# Patient Record
Sex: MALE | Race: WHITE | NOT HISPANIC OR LATINO
[De-identification: names, ages, dates, MRNs, and addresses within clinical notes are randomized per-mention and may not be internally consistent; named-entity substitution may affect disease eponyms.]

---

## 2024-03-19 ENCOUNTER — TRANSCRIPTION ENCOUNTER (OUTPATIENT)
Age: 74
End: 2024-03-19

## 2024-03-19 ENCOUNTER — RESULT REVIEW (OUTPATIENT)
Age: 74
End: 2024-03-19

## 2024-03-19 ENCOUNTER — INPATIENT (INPATIENT)
Facility: HOSPITAL | Age: 74
LOS: 0 days | Discharge: HOME | DRG: 313 | End: 2024-03-19
Attending: INTERNAL MEDICINE | Admitting: INTERNAL MEDICINE
Payer: MEDICAID

## 2024-03-19 VITALS
HEIGHT: 66 IN | HEART RATE: 77 BPM | OXYGEN SATURATION: 99 % | RESPIRATION RATE: 18 BRPM | SYSTOLIC BLOOD PRESSURE: 130 MMHG | DIASTOLIC BLOOD PRESSURE: 67 MMHG | WEIGHT: 119.93 LBS | TEMPERATURE: 98 F

## 2024-03-19 VITALS
SYSTOLIC BLOOD PRESSURE: 122 MMHG | DIASTOLIC BLOOD PRESSURE: 58 MMHG | RESPIRATION RATE: 18 BRPM | HEART RATE: 64 BPM | OXYGEN SATURATION: 95 %

## 2024-03-19 DIAGNOSIS — R07.9 CHEST PAIN, UNSPECIFIED: ICD-10-CM

## 2024-03-19 DIAGNOSIS — I49.9 CARDIAC ARRHYTHMIA, UNSPECIFIED: ICD-10-CM

## 2024-03-19 PROBLEM — Z00.00 ENCOUNTER FOR PREVENTIVE HEALTH EXAMINATION: Status: ACTIVE | Noted: 2024-03-19

## 2024-03-19 LAB
A1C WITH ESTIMATED AVERAGE GLUCOSE RESULT: 5.9 % — HIGH (ref 4–5.6)
A1C WITH ESTIMATED AVERAGE GLUCOSE RESULT: 6.2 % — HIGH (ref 4–5.6)
ADD ON TEST-SPECIMEN IN LAB: SIGNIFICANT CHANGE UP
AMPHET UR-MCNC: NEGATIVE — SIGNIFICANT CHANGE UP
ANION GAP SERPL CALC-SCNC: 13 MMOL/L — SIGNIFICANT CHANGE UP (ref 5–17)
ANION GAP SERPL CALC-SCNC: 8 MMOL/L — SIGNIFICANT CHANGE UP (ref 5–17)
ANISOCYTOSIS BLD QL: SLIGHT — SIGNIFICANT CHANGE UP
APPEARANCE UR: CLEAR — SIGNIFICANT CHANGE UP
APTT BLD: 25.9 SEC — SIGNIFICANT CHANGE UP (ref 24.5–35.6)
BARBITURATES UR SCN-MCNC: NEGATIVE — SIGNIFICANT CHANGE UP
BASOPHILS # BLD AUTO: 0.08 K/UL — SIGNIFICANT CHANGE UP (ref 0–0.2)
BASOPHILS NFR BLD AUTO: 0.8 % — SIGNIFICANT CHANGE UP (ref 0–2)
BENZODIAZ UR-MCNC: NEGATIVE — SIGNIFICANT CHANGE UP
BILIRUB UR-MCNC: NEGATIVE — SIGNIFICANT CHANGE UP
BUN SERPL-MCNC: 17 MG/DL — SIGNIFICANT CHANGE UP (ref 7–23)
BUN SERPL-MCNC: 20 MG/DL — SIGNIFICANT CHANGE UP (ref 7–23)
BURR CELLS BLD QL SMEAR: PRESENT — SIGNIFICANT CHANGE UP
CALCIUM SERPL-MCNC: 8.7 MG/DL — SIGNIFICANT CHANGE UP (ref 8.4–10.5)
CALCIUM SERPL-MCNC: 8.7 MG/DL — SIGNIFICANT CHANGE UP (ref 8.4–10.5)
CHLORIDE SERPL-SCNC: 100 MMOL/L — SIGNIFICANT CHANGE UP (ref 96–108)
CHLORIDE SERPL-SCNC: 104 MMOL/L — SIGNIFICANT CHANGE UP (ref 96–108)
CHOLEST SERPL-MCNC: 164 MG/DL — SIGNIFICANT CHANGE UP
CO2 SERPL-SCNC: 22 MMOL/L — SIGNIFICANT CHANGE UP (ref 22–31)
CO2 SERPL-SCNC: 26 MMOL/L — SIGNIFICANT CHANGE UP (ref 22–31)
COCAINE METAB.OTHER UR-MCNC: NEGATIVE — SIGNIFICANT CHANGE UP
COLOR SPEC: SIGNIFICANT CHANGE UP
CREAT SERPL-MCNC: 0.72 MG/DL — SIGNIFICANT CHANGE UP (ref 0.5–1.3)
CREAT SERPL-MCNC: 0.73 MG/DL — SIGNIFICANT CHANGE UP (ref 0.5–1.3)
DIFF PNL FLD: NEGATIVE — SIGNIFICANT CHANGE UP
EGFR: 95 ML/MIN/1.73M2 — SIGNIFICANT CHANGE UP
EGFR: 95 ML/MIN/1.73M2 — SIGNIFICANT CHANGE UP
ELLIPTOCYTES BLD QL SMEAR: SLIGHT — SIGNIFICANT CHANGE UP
EOSINOPHIL # BLD AUTO: 0 K/UL — SIGNIFICANT CHANGE UP (ref 0–0.5)
EOSINOPHIL NFR BLD AUTO: 0 % — SIGNIFICANT CHANGE UP (ref 0–6)
ESTIMATED AVERAGE GLUCOSE: 123 MG/DL — HIGH (ref 68–114)
ESTIMATED AVERAGE GLUCOSE: 131 MG/DL — HIGH (ref 68–114)
FERRITIN SERPL-MCNC: 85 NG/ML — SIGNIFICANT CHANGE UP (ref 30–400)
GIANT PLATELETS BLD QL SMEAR: PRESENT — SIGNIFICANT CHANGE UP
GLUCOSE SERPL-MCNC: 208 MG/DL — HIGH (ref 70–99)
GLUCOSE SERPL-MCNC: 88 MG/DL — SIGNIFICANT CHANGE UP (ref 70–99)
GLUCOSE UR QL: 100 MG/DL
HCT VFR BLD CALC: 37.7 % — LOW (ref 39–50)
HCT VFR BLD CALC: 40.4 % — SIGNIFICANT CHANGE UP (ref 39–50)
HDLC SERPL-MCNC: 61 MG/DL — SIGNIFICANT CHANGE UP
HGB BLD-MCNC: 12.4 G/DL — LOW (ref 13–17)
HGB BLD-MCNC: 13.3 G/DL — SIGNIFICANT CHANGE UP (ref 13–17)
INR BLD: 0.99 — SIGNIFICANT CHANGE UP (ref 0.85–1.18)
IRON SATN MFR SERPL: 117 UG/DL — SIGNIFICANT CHANGE UP (ref 45–165)
IRON SATN MFR SERPL: 47 % — SIGNIFICANT CHANGE UP (ref 16–55)
KETONES UR-MCNC: 40 MG/DL
LEUKOCYTE ESTERASE UR-ACNC: NEGATIVE — SIGNIFICANT CHANGE UP
LIPID PNL WITH DIRECT LDL SERPL: 92 MG/DL — SIGNIFICANT CHANGE UP
LYMPHOCYTES # BLD AUTO: 0.09 K/UL — LOW (ref 1–3.3)
LYMPHOCYTES # BLD AUTO: 0.9 % — LOW (ref 13–44)
MACROCYTES BLD QL: SLIGHT — SIGNIFICANT CHANGE UP
MAGNESIUM SERPL-MCNC: 1.7 MG/DL — SIGNIFICANT CHANGE UP (ref 1.6–2.6)
MAGNESIUM SERPL-MCNC: 1.9 MG/DL — SIGNIFICANT CHANGE UP (ref 1.6–2.6)
MANUAL SMEAR VERIFICATION: SIGNIFICANT CHANGE UP
MCHC RBC-ENTMCNC: 30.8 PG — SIGNIFICANT CHANGE UP (ref 27–34)
MCHC RBC-ENTMCNC: 31.8 PG — SIGNIFICANT CHANGE UP (ref 27–34)
MCHC RBC-ENTMCNC: 32.9 GM/DL — SIGNIFICANT CHANGE UP (ref 32–36)
MCHC RBC-ENTMCNC: 32.9 GM/DL — SIGNIFICANT CHANGE UP (ref 32–36)
MCV RBC AUTO: 93.8 FL — SIGNIFICANT CHANGE UP (ref 80–100)
MCV RBC AUTO: 96.7 FL — SIGNIFICANT CHANGE UP (ref 80–100)
METHADONE UR-MCNC: NEGATIVE — SIGNIFICANT CHANGE UP
MICROCYTES BLD QL: SLIGHT — SIGNIFICANT CHANGE UP
MONOCYTES # BLD AUTO: 0.09 K/UL — SIGNIFICANT CHANGE UP (ref 0–0.9)
MONOCYTES NFR BLD AUTO: 0.9 % — LOW (ref 2–14)
NEUTROPHILS # BLD AUTO: 10.03 K/UL — HIGH (ref 1.8–7.4)
NEUTROPHILS NFR BLD AUTO: 97.4 % — HIGH (ref 43–77)
NITRITE UR-MCNC: NEGATIVE — SIGNIFICANT CHANGE UP
NON HDL CHOLESTEROL: 103 MG/DL — SIGNIFICANT CHANGE UP
NRBC # BLD: 0 /100 WBCS — SIGNIFICANT CHANGE UP (ref 0–0)
NT-PROBNP SERPL-SCNC: 158 PG/ML — SIGNIFICANT CHANGE UP (ref 0–300)
OPIATES UR-MCNC: NEGATIVE — SIGNIFICANT CHANGE UP
OVALOCYTES BLD QL SMEAR: SLIGHT — SIGNIFICANT CHANGE UP
PCP SPEC-MCNC: SIGNIFICANT CHANGE UP
PCP UR-MCNC: NEGATIVE — SIGNIFICANT CHANGE UP
PH UR: 5.5 — SIGNIFICANT CHANGE UP (ref 5–8)
PLAT MORPH BLD: ABNORMAL
PLATELET # BLD AUTO: 129 K/UL — LOW (ref 150–400)
PLATELET # BLD AUTO: 132 K/UL — LOW (ref 150–400)
POIKILOCYTOSIS BLD QL AUTO: SLIGHT — SIGNIFICANT CHANGE UP
POTASSIUM SERPL-MCNC: 3.8 MMOL/L — SIGNIFICANT CHANGE UP (ref 3.5–5.3)
POTASSIUM SERPL-MCNC: 3.8 MMOL/L — SIGNIFICANT CHANGE UP (ref 3.5–5.3)
POTASSIUM SERPL-SCNC: 3.8 MMOL/L — SIGNIFICANT CHANGE UP (ref 3.5–5.3)
POTASSIUM SERPL-SCNC: 3.8 MMOL/L — SIGNIFICANT CHANGE UP (ref 3.5–5.3)
PROT UR-MCNC: SIGNIFICANT CHANGE UP MG/DL
PROTHROM AB SERPL-ACNC: 11.3 SEC — SIGNIFICANT CHANGE UP (ref 9.5–13)
RBC # BLD: 4.02 M/UL — LOW (ref 4.2–5.8)
RBC # BLD: 4.18 M/UL — LOW (ref 4.2–5.8)
RBC # FLD: 13.2 % — SIGNIFICANT CHANGE UP (ref 10.3–14.5)
RBC # FLD: 13.5 % — SIGNIFICANT CHANGE UP (ref 10.3–14.5)
RBC BLD AUTO: ABNORMAL
SODIUM SERPL-SCNC: 135 MMOL/L — SIGNIFICANT CHANGE UP (ref 135–145)
SODIUM SERPL-SCNC: 138 MMOL/L — SIGNIFICANT CHANGE UP (ref 135–145)
SP GR SPEC: 1.02 — SIGNIFICANT CHANGE UP (ref 1–1.03)
T4 FREE SERPL-MCNC: 1.11 NG/DL — SIGNIFICANT CHANGE UP (ref 0.93–1.7)
THC UR QL: NEGATIVE — SIGNIFICANT CHANGE UP
TIBC SERPL-MCNC: 251 UG/DL — SIGNIFICANT CHANGE UP (ref 220–430)
TRIGL SERPL-MCNC: 57 MG/DL — SIGNIFICANT CHANGE UP
TROPONIN T, HIGH SENSITIVITY RESULT: 15 NG/L — SIGNIFICANT CHANGE UP (ref 0–51)
TROPONIN T, HIGH SENSITIVITY RESULT: 20 NG/L — SIGNIFICANT CHANGE UP (ref 0–51)
TROPONIN T, HIGH SENSITIVITY RESULT: 25 NG/L — SIGNIFICANT CHANGE UP (ref 0–51)
TSH SERPL-MCNC: 0.98 UIU/ML — SIGNIFICANT CHANGE UP (ref 0.27–4.2)
TSH SERPL-MCNC: 1.15 UIU/ML — SIGNIFICANT CHANGE UP (ref 0.27–4.2)
UIBC SERPL-MCNC: 134 UG/DL — SIGNIFICANT CHANGE UP (ref 110–370)
UROBILINOGEN FLD QL: 1 MG/DL — SIGNIFICANT CHANGE UP (ref 0.2–1)
WBC # BLD: 10.3 K/UL — SIGNIFICANT CHANGE UP (ref 3.8–10.5)
WBC # BLD: 8.85 K/UL — SIGNIFICANT CHANGE UP (ref 3.8–10.5)
WBC # FLD AUTO: 10.3 K/UL — SIGNIFICANT CHANGE UP (ref 3.8–10.5)
WBC # FLD AUTO: 8.85 K/UL — SIGNIFICANT CHANGE UP (ref 3.8–10.5)

## 2024-03-19 PROCEDURE — 71045 X-RAY EXAM CHEST 1 VIEW: CPT | Mod: 26

## 2024-03-19 PROCEDURE — 80061 LIPID PANEL: CPT

## 2024-03-19 PROCEDURE — 84443 ASSAY THYROID STIM HORMONE: CPT

## 2024-03-19 PROCEDURE — 83036 HEMOGLOBIN GLYCOSYLATED A1C: CPT

## 2024-03-19 PROCEDURE — 85025 COMPLETE CBC W/AUTO DIFF WBC: CPT

## 2024-03-19 PROCEDURE — 99239 HOSP IP/OBS DSCHRG MGMT >30: CPT

## 2024-03-19 PROCEDURE — 93306 TTE W/DOPPLER COMPLETE: CPT | Mod: 26

## 2024-03-19 PROCEDURE — 36415 COLL VENOUS BLD VENIPUNCTURE: CPT

## 2024-03-19 PROCEDURE — 80048 BASIC METABOLIC PNL TOTAL CA: CPT

## 2024-03-19 PROCEDURE — 84484 ASSAY OF TROPONIN QUANT: CPT

## 2024-03-19 PROCEDURE — 83540 ASSAY OF IRON: CPT

## 2024-03-19 PROCEDURE — 80307 DRUG TEST PRSMV CHEM ANLYZR: CPT

## 2024-03-19 PROCEDURE — 99285 EMERGENCY DEPT VISIT HI MDM: CPT

## 2024-03-19 PROCEDURE — 83550 IRON BINDING TEST: CPT

## 2024-03-19 PROCEDURE — 85610 PROTHROMBIN TIME: CPT

## 2024-03-19 PROCEDURE — 83735 ASSAY OF MAGNESIUM: CPT

## 2024-03-19 PROCEDURE — 82728 ASSAY OF FERRITIN: CPT

## 2024-03-19 PROCEDURE — 83880 ASSAY OF NATRIURETIC PEPTIDE: CPT

## 2024-03-19 PROCEDURE — 81003 URINALYSIS AUTO W/O SCOPE: CPT

## 2024-03-19 PROCEDURE — 85730 THROMBOPLASTIN TIME PARTIAL: CPT

## 2024-03-19 PROCEDURE — 93306 TTE W/DOPPLER COMPLETE: CPT

## 2024-03-19 PROCEDURE — 85027 COMPLETE CBC AUTOMATED: CPT

## 2024-03-19 PROCEDURE — 84439 ASSAY OF FREE THYROXINE: CPT

## 2024-03-19 PROCEDURE — 71045 X-RAY EXAM CHEST 1 VIEW: CPT

## 2024-03-19 RX ORDER — POTASSIUM CHLORIDE 20 MEQ
40 PACKET (EA) ORAL ONCE
Refills: 0 | Status: DISCONTINUED | OUTPATIENT
Start: 2024-03-19 | End: 2024-03-19

## 2024-03-19 NOTE — ED PROVIDER NOTE - PHYSICAL EXAMINATION
Constitutional : Well appearing, non-toxic, no acute distress. awake, alert, oriented to person, place, time/situation.  Head : head normocephalic, atraumatic  EENMT : eyes clear bilaterally, PERRL, EOMI. airway patent. moist mucous membranes. neck supple.  Cardiac : Normal rate, regular rhythm. No murmur appreciated, trace bilateral LE edema, symmetric.   Resp : Breath sounds clear and equal bilaterally. Respirations even and unlabored.   Gastro : abdomen soft, nontender, nondistended. no rebound or guarding.   MSK :  range of motion is not limited, no muscle or joint tenderness  Back : No evidence of trauma. No spinal or CVA tenderness.  Vasc : Extremities warm and well perfused. 2+ radial and DP pulses. cap refill <2 seconds  Neuro : Alert and oriented, CNII-XII grossly intact, no motor or sensory deficits.  Skin : Skin normal color for race, warm, dry and intact. No evidence of rash.  Psych : Alert and oriented to person, place, time/situation. normal mood and affect. no apparent risk to self or others.

## 2024-03-19 NOTE — H&P ADULT - HISTORY OF PRESENT ILLNESS
Patient is a 73y old  Male with no known pmhx BIBEMS for palpitations and chest pain. Patient states she was sitting at home when she was woken up out of her sleep with acute chest pain. She caled 911 and EMS arrived and found her HR to be "irregular" and in the 160 range. Patient states that as soon as they brought her outside in the cold she felt much better and her heart rate resolved into the 60s-70s. Upon presentation to [facility], patient remained in NSR with PVCs, with no chest pain. Denies n/v/d, recent sick contacts, falls or bleeding within the last 3 months      In the ER Vitals T(F): 97.8, Max: 97.8 (03-19 @ 02:20) HR: 77 (77 - 77) BP: 130/67 (130/67 - 130/67) RR: 18 (18 - 18) SpO2: 99% (99% - 99%)  Labs: Troponin 19,    Imaging: CXR negative for vascular congestion or cardiomegaly  EKG: NSR 77 w PVCs   Interventions: none

## 2024-03-19 NOTE — H&P ADULT - NSHPPHYSICALEXAM_GEN_ALL_CORE
Physical exam:  General: No apparent distress  HEENT: Anicteric sclera. Moist mucous membranes. JVD-  Cardiac: Regular rate and rhythm. No murmurs, rubs, or gallops.   Vascular: Symmetric radial pulses. Dorsalis pedis pulses palpable.   Respiratory: Normal effort. Clear to ascultation.   Abdomen: Soft, nontender. Audible bowel sounds.   Extremities: Warm with no edema. No cyanosis or clubbing.   Skin: Warm and dry. No rash.   Neurologic: Grossly normal motor function.   Psychiatric: Oriented to person, place, and time.

## 2024-03-19 NOTE — CONSULT NOTE ADULT - ASSESSMENT
73y old  Male with no known pmhx BIBEMS for palpitations and chest pain. Patient states she was sitting at home when she was woken up out of her sleep with acute chest pain. She caled 911 and EMS arrived and found her HR to be "irregular" and in the 160 range. Patient states that as soon as they brought him outside in the cold he felt much better and her heart rate resolved into the 60s-70s. Upon presentation to [facility], patient remained in NSR with PACs, with no chest pain. Ep consulted for further management of possible arrythmia.     - Patient with episodes of "fast hear rate" with chest pain prior to arrival, however episode not shown   - Patient has maintained NSR so far with PACs on telemetry. Echocardiogram normal.   - Patient recommended to have a 2 week monitor (will mail to patient's home) and follow-up with EP in May 2024

## 2024-03-19 NOTE — PATIENT PROFILE ADULT - FALL HARM RISK - RISK INTERVENTIONS

## 2024-03-19 NOTE — H&P ADULT - ASSESSMENT
73 yr old with no pmhx presenting with acute onset of chest pain with irregular HR in the 160s that converted to NSR when in route to [facility]. Admitted to cardiac telemetry for further monitoring

## 2024-03-19 NOTE — DISCHARGE NOTE NURSING/CASE MANAGEMENT/SOCIAL WORK - NSTRANSFERBELONGINGSDISPO_GEN_A_NUR
with patient
For information on Fall & Injury Prevention, visit: [email or web address] OR  [email or web address] OR  https://www.cdc.gov/steadi/patient.html

## 2024-03-19 NOTE — ED PROVIDER NOTE - PROGRESS NOTE DETAILS
ecg on arrival sinus w PACs. rate 60s. rpt ecg - NSR rate 75.   cxr clear  labs unremarkable w HS trop negative.   pt admitted to [facility] for workup of new afib and r/o acs.

## 2024-03-19 NOTE — CONSULT NOTE ADULT - SUBJECTIVE AND OBJECTIVE BOX
HPI: 73y old  Male with no known pmhx BIBEMS for palpitations and chest pain. Patient states she was sitting at home when she was woken up out of her sleep with acute chest pain. She caled 911 and EMS arrived and found her HR to be "irregular" and in the 160 range. Patient states that as soon as they brought him outside in the cold he felt much better and her heart rate resolved into the 60s-70s. Upon presentation to [facility], patient remained in NSR with PACs, with no chest pain. Ep consulted for further management of possible arrythmia.     Subjective: Patient seen and examined at bedside with wife at bedside. Patient states that he had an episode of chest pain but did not have any episodes of palpitations. As per wife, he does not go to doctors regularly, but is planned to undergo hernia repair this Thursday. He had an ecg for his cardiac clearance and was cleared to undergo surgery. Wife states that the strips from the ambulance were given to ED, but strips are not found, so unclear what patient's heart rate did during this episode of chest pain. He denies current cp, palpitations, fatigue, orthopnea, LE edema, or other complaints at this time.     PAST MEDICAL & SURGICAL HISTORY:  No pertinent past medical history  No significant past surgical history  No pertinent family history in first degree relatives    Social History: no smoking, no drugs, no algohol    pertinent home medications:    Inpatient Medications:   potassium chloride    Tablet ER 40 milliEquivalent(s) Oral once    Allergies: No Known Allergies    ROS:   CONSTITUTIONAL: No fever, weight loss + fatigue  EYES: Pt denies  RESPIRATORY: No cough, wheezing, chills or hemoptysis; No Shortness of Breath  CARDIOVASCULAR: see HPI  GASTROINTESTINAL: Pt denies  NEUROLOGICAL: Pt denies  SKIN: Pt denies   PSYCHIATRIC: Pt denies  HEME/LYMPH: Pt denies    PHYSICAL:  T(C): 36.9 (03-19-24 @ 08:21), Max: 37 (03-19-24 @ 06:35)  HR: 55 (03-19-24 @ 08:21) (55 - 77)  BP: 115/54 (03-19-24 @ 08:21) (102/62 - 137/70)  RR: 18 (03-19-24 @ 08:21) (16 - 18)  SpO2: 98% (03-19-24 @ 08:21) (98% - 99%)  Wt(kg): --    Appearance: No acute distress, well developed  Eyes: normal appearing conjunctiva, pupils and eyelids  Cardiovascular: Normal S1 S2, No JVD, No murmurs, No edema  Respiratory: Lungs clear to auscultation	bilaterally.  No wheeze, rhonchi, rales noted  Gastrointestinal:  Soft, NT/ND 	  Neurologic:  No deficit noted  Psych: A&Ox3, normal mood/affect  Musculoskeletal: normal  Skin: no rash noted, normal color and pigmentation.        LABS:                        12.4   8.85  )-----------( 132      ( 19 Mar 2024 10:19 )             37.7     03-19    138  |  104  |  17  ----------------------------<  88  3.8   |  26  |  0.72    Ca    8.7      19 Mar 2024 10:19  Mg     1.9     03-19      PT/INR - ( 19 Mar 2024 02:30 )   PT: 11.3 sec;   INR: 0.99          PTT - ( 19 Mar 2024 02:30 )  PTT:25.9 sec  TSH 0.982  Troponin 15-> 20 -> 25    EKG: NSR @ 63 bpm, with PACS     Telemetry: NSR @ 60s - 80s bpm, with PACS     ECHO 03/19/24:   1. Normal left and right ventricular size and systolic function.   2. Mild tricuspid regurgitation.   3. Pulmonary hypertension present, pulmonary artery systolic pressure is   37 mmHg.   4. No pericardial effusion.   5. No prior echo is available for comparison.

## 2024-03-19 NOTE — DISCHARGE NOTE PROVIDER - HOSPITAL COURSE
INCOMPLETE    74 y/o M w/ no PMH BIBEMS to [facility] ED 3/19 with CC of  palpitations and chest pain. Patient states she was sitting at home when she was woken up out of her sleep with acute chest pain. She caled 911 and EMS arrived and found her HR to be "irregular" and in the 160 range. Patient states that as soon as they brought her outside in the cold she felt much better and her heart rate resolved into the 60s-70s. Upon presentation to [facility], patient remained in NSR with PVCs, with no chest pain. Denies n/v/d, recent sick contacts, falls or bleeding within the last 3 months . In the ER Vitals T(F): 97.8, Max: 97.8 (03-19 @ 02:20) HR: 77 (77 - 77) BP: 130/67 (130/67 - 130/67) RR: 18 (18 - 18) SpO2: 99% (99% - 99%)  Labs: Troponin 19, . Imaging: CXR negative for vascular congestion or cardiomegaly, EKG: NSR 77 w PVCs . Interventions: none. Pt is now admitted to cardiac tele for further management of CP and palpitations to R/O ACS and tele monitoring.  HsTRopT 15>20, tele unremarkable.  ECHO 3/19/24:  EP consulted for o/p holter monitor.     Pt. seen and examined at bedside today am. Pt. comfortable, denies any CP, SOB, dizziness, palpitations,  VSS. Labs stable o/n. home meds reviewed with Dr. Shah and pt. to be d/c on _________  	  Pt. stable to be d/c as per Dr. Shah and to f/u with  _____  in 1-2 week. Patient has been given appropriate discharge instructions including medication regimen, access site management and follow up. Prescriptions have been e-prescribed to patient's preferred pharmacy   73M w/ no significant PMHx, initially BIBEMS to [facility] ED c/o palpitations and sudden CP at rest. Per EMS, pt found to have HR 160s and irregular, however no strips provided. In ED pt in NSR w/ HR 60-70s. EKG revealed NSR, PVCs and non ischemic. Labs significant for Trop 19 and . Pt admitted to cardiac tele for monitoring. Pt remained CP free and HD stable, no events on tele. TTE revealed ____. EP consulted for outpt cardiac monitor which will be mailed to pt.    Pt seen and examined at bedside this AM without any complaints or events overnight, VSS, labs and telemetry reviewed and pt stable for discharge as discussed with Dr. Shah. Pt has received appropriate discharge instructions, including medication regimen,  and follow up with Dr. Mott 3/27/24. 73M w/ no significant PMHx, initially BIBEMS to [facility] ED c/o palpitations and sudden CP at rest. Per EMS, pt found to have HR 160s and irregular, however no strips provided. In ED pt in NSR w/ HR 60-70s. EKG revealed NSR, PVCs and non ischemic. Labs significant for Trop 19 and . Pt admitted to cardiac tele for monitoring. Pt remained CP free and HD stable, no events on tele. TTE revealed LVEF normal, mild TR and PASP 37. EP consulted for outpt cardiac monitor which will be mailed to pt.    Pt seen and examined at bedside this AM without any complaints or events overnight, VSS, labs and telemetry reviewed and pt stable for discharge as discussed with Dr. Shah. Pt has received appropriate discharge instructions, including medication regimen,  and follow up with Dr. Mott 3/27/24.

## 2024-03-19 NOTE — ED PROVIDER NOTE - OBJECTIVE STATEMENT
73 yr old male, no medical history, no daily meds, presents to the Emergency Department w chest pain. pt woke from sleep today w central chest discomfort. non-radiating. on EMS arrival HR was "irregular and 160s" per ems team. once they wheeled pt outside HR broke to 60-70s. ekg en route showed sinus rhythm 60s w PVCs. given ASA en route. chest pain has overall improved.   no sob, palpitations, lightheadedness, diaphoresis, n/v, leg swelling, near-syncope / syncope. no fever, abd pain. no headache, vision changes, extremity weakness / numbness / tingling.   no recent infectious symptoms. no prolonged immobilization / recent travel, hx dvt/pe.

## 2024-03-19 NOTE — ED ADULT NURSE NOTE - CAS EDN INTEG ASSESS
Initiate Treatment: moisturizers on daily basis
Detail Level: Generalized
- - -
Detail Level: Detailed
Initiate Treatment: advised patient to use sunscreen SPF50 or above and to reapply on regular basis

## 2024-03-19 NOTE — H&P ADULT - PROBLEM SELECTOR PLAN 1
irregular HR to the 160s by EMS that is now in NSR   Troponin and EKG non ischemic   patient currently chest pain free   CHADsVasc 3   No need for AC at this time or rate control   F/u TTE, iron panel, TFT, a1c and lipid profile   consider holter monitor on DC with EP follow up     F: None  E: Replete if K<4 or Mag<2  N: DASH Diet

## 2024-03-19 NOTE — ED PROVIDER NOTE - WHICH SHOWED
Initial EKG sinus rhythm with PACs no acute ischemia repeat EKG  normal sinus rhythm no acute ischemia

## 2024-03-19 NOTE — ED ADULT NURSE NOTE - NSFALLUNIVINTERV_ED_ALL_ED
Bed/Stretcher in lowest position, wheels locked, appropriate side rails in place/Call bell, personal items and telephone in reach/Instruct patient to call for assistance before getting out of bed/chair/stretcher/Non-slip footwear applied when patient is off stretcher/War to call system/Physically safe environment - no spills, clutter or unnecessary equipment/Purposeful proactive rounding/Room/bathroom lighting operational, light cord in reach

## 2024-03-19 NOTE — ED ADULT NURSE NOTE - ED STAT RN HANDOFF DETAILS
Covering for RN Cabrera: Patient stable for transfer. Transfer and plan of care discussed with patient and family member present at bedside and verbalized understanding. No acute distress noted. Safety precautions maintained. Belongings secured with patient.

## 2024-03-19 NOTE — ED ADULT NURSE NOTE - OBJECTIVE STATEMENT
Pt aaox4, pt c/o midsternal CP x2 hrs, pt reports "I got aspirin with EMS and I feel better but I don't know if its from that". Pt family states "He usually is bradycardic". Pt denies any PMH. Pt denies sob, n/v/d, dizziness, fevers, chills, HA, weakness.

## 2024-03-19 NOTE — ED ADULT TRIAGE NOTE - CHIEF COMPLAINT QUOTE
Pt reports generalized chest pain x3 hours. Per EMS pt initial HR was 160s, in afib, resolved on its own. Pt HR remains irregular. pt denies any hx of afib or cardiac hx.

## 2024-03-19 NOTE — H&P ADULT - NS ATTEND AMEND GEN_ALL_CORE FT
73 year old with hernia presents with palpitations and chest pain    1. Palpitations  New onset, EKG with normal sinus rhythm and PACs, no events on telemetry, echocardiogram with normal LV structure and systolic function.  Holter and outpatient cardiology follow up.    2. CP  Brief episode of CP lasting seconds, no ischemic changes on EKG, hs troponin negative x 3, hb1ac 6.2%LDL 92.  Outpatient follow up.    I recommend postponing hernia repair surgery until outpatient evaluation.

## 2024-03-19 NOTE — DISCHARGE NOTE PROVIDER - NSDCFUSCHEDAPPT_GEN_ALL_CORE_FT
Constantine Mott Physician [facility]  [facility] [address]  Scheduled Appointment: 03/27/2024

## 2024-03-19 NOTE — ED PROVIDER NOTE - CLINICAL SUMMARY MEDICAL DECISION MAKING FREE TEXT BOX
no medical history, no daily meds, woke w chest pain overnight. ems arrival monitor was 160s and irregular. broke prior to arrival w/o interventions. chest pain has since resolved. ASA given by ems. no associated symptoms. no hx similar.   pt nontoxic appearing, stable vitals - HR 60-70s, normotensive, exam w trace bilateral , symmetric LE edema. lungs clear.   ? new afib , resolved. chest pain  assess for anemia, electrolyte derangement, dehydeation  low suspicion acs - already asa pta. no hx cardiac workup, consider admission for cards eval   doubt pe - no SOB. not concerned for dissection based on presentation.   plan - labs, ecg, cxr  observe on cardiac monitor  consider cards admission

## 2024-03-19 NOTE — DISCHARGE NOTE NURSING/CASE MANAGEMENT/SOCIAL WORK - PATIENT PORTAL LINK FT
You can access the FollowMyHealth Patient Portal offered by [facility] by registering at the following website: [email or web address]. By joining [employer]’s FollowMyHealth portal, you will also be able to view your health information using other applications (apps) compatible with our system.

## 2024-03-19 NOTE — DISCHARGE NOTE PROVIDER - CARE PROVIDER_API CALL
Constantine Mott  Cardiology  [address] [place] [address]  Phone: [phone]  Fax: [phone]  Scheduled Appointment: 03/27/2024 09:40 AM

## 2024-03-19 NOTE — H&P ADULT - NSHPLABSRESULTS_GEN_ALL_CORE
- Labs:                        13.3   10.30 )-----------( 129      ( 19 Mar 2024 02:30 )             40.4     03-19    135  |  100  |  20  ----------------------------<  208<H>  3.8   |  22  |  0.73    Ca    8.7      19 Mar 2024 02:30  Mg     1.7     03-19      PT/INR - ( 19 Mar 2024 02:30 )   PT: 11.3 sec;   INR: 0.99          PTT - ( 19 Mar 2024 02:30 )  PTT:25.9 sec

## 2024-03-20 ENCOUNTER — NON-APPOINTMENT (OUTPATIENT)
Age: 74
End: 2024-03-20

## 2024-03-22 PROBLEM — Z78.9 OTHER SPECIFIED HEALTH STATUS: Chronic | Status: ACTIVE | Noted: 2024-03-19

## 2024-03-25 DIAGNOSIS — R07.9 CHEST PAIN, UNSPECIFIED: ICD-10-CM

## 2024-03-25 DIAGNOSIS — I49.3 VENTRICULAR PREMATURE DEPOLARIZATION: ICD-10-CM

## 2024-03-27 ENCOUNTER — NON-APPOINTMENT (OUTPATIENT)
Age: 74
End: 2024-03-27

## 2024-03-27 ENCOUNTER — APPOINTMENT (OUTPATIENT)
Dept: HEART AND VASCULAR | Facility: CLINIC | Age: 74
End: 2024-03-27
Payer: MEDICAID

## 2024-03-27 VITALS
TEMPERATURE: 97.8 F | HEIGHT: 66 IN | WEIGHT: 116 LBS | BODY MASS INDEX: 18.64 KG/M2 | DIASTOLIC BLOOD PRESSURE: 80 MMHG | SYSTOLIC BLOOD PRESSURE: 140 MMHG | HEART RATE: 55 BPM | OXYGEN SATURATION: 98 %

## 2024-03-27 DIAGNOSIS — I47.10 SUPRAVENTRICULAR TACHYCARDIA, UNSPECIFIED: ICD-10-CM

## 2024-03-27 DIAGNOSIS — R29.818 OTHER SYMPTOMS AND SIGNS INVOLVING THE NERVOUS SYSTEM: ICD-10-CM

## 2024-03-27 DIAGNOSIS — R07.9 CHEST PAIN, UNSPECIFIED: ICD-10-CM

## 2024-03-27 PROCEDURE — 99496 TRANSJ CARE MGMT HIGH F2F 7D: CPT

## 2024-03-27 PROCEDURE — 93000 ELECTROCARDIOGRAM COMPLETE: CPT

## 2024-03-27 PROCEDURE — G2211 COMPLEX E/M VISIT ADD ON: CPT | Mod: NC,1L

## 2024-03-27 NOTE — HISTORY OF PRESENT ILLNESS
[FreeTextEntry1] : 73 M chest pain.  - HTN, - DM , - lipids, - tobacco Admitted [facility] last week after  sitting at home when he was woken up out of sleep with acute chest pain. EMS arrived and found her HR to be "irregular" and in the 160 range. Patient states that as soon as they brought him outside in the cold he felt much better and heart rate resolved into the 60s-70s. No EKG strips available Upon presentation to [facility], patient remained in NSR with PVCs, with no chest pain. Denies n/v/d, recent sick contacts, falls or bleeding within the last 3 months.  In the ER Vitals T(F): 97.8, Max: 97.8 (03-19 @ 02:20) HR: 77 (77 - 77) BP: 130/67 (130/67 - 130/67) RR: 18 (18 - 18) SpO2: 99% (99% - 99%) Labs: Troponin 19,  Imaging: CXR negative for vascular congestion or cardiomegaly EKG: NSR 77 w PVCs Pt admitted to cardiac tele  for monitoring. Pt remained CP free and HD stable, no events on tele. TTE  revealed LVEF normal, mild TR and PASP 37. EP consulted for outpt cardiac  monitor which will be mailed to pt.  In general, walks as needed, without limitations, Can walk stairs.  Since d/c no further symptoms  GRACE screen  + snoring + day time somnolence - non restorative sleep + witnessedd apnea   Scheduled for elective hernia repir

## 2024-03-27 NOTE — PHYSICAL EXAM
[Well Developed] : well developed [No Acute Distress] : no acute distress [Normal Conjunctiva] : normal conjunctiva [Normal Venous Pressure] : normal venous pressure [No Carotid Bruit] : no carotid bruit [Normal S1, S2] : normal S1, S2 [No Murmur] : no murmur [No Rub] : no rub [Clear Lung Fields] : clear lung fields [No Gallop] : no gallop [Good Air Entry] : good air entry [No Respiratory Distress] : no respiratory distress  [Soft] : abdomen soft [Non Tender] : non-tender [No Masses/organomegaly] : no masses/organomegaly [Normal Bowel Sounds] : normal bowel sounds [Normal Gait] : normal gait [No Edema] : no edema [No Cyanosis] : no cyanosis [No Clubbing] : no clubbing [No Varicosities] : no varicosities [No Rash] : no rash [No Skin Lesions] : no skin lesions [No Focal Deficits] : no focal deficits [Moves all extremities] : moves all extremities [Normal Speech] : normal speech [Alert and Oriented] : alert and oriented [Normal memory] : normal memory [S4] : S4 [de-identified] : thin

## 2024-03-27 NOTE — ASSESSMENT
[FreeTextEntry1] : EKG NSR NSST Echo 3/24 CONCLUSIONS:   1. Normal left and right ventricular size and systolic function.  2. Mild tricuspid regurgitation.  3. Pulmonary hypertension present, pulmonary artery systolic pressure is 37 mmHg.  4. No pericardial effusion.  5. No prior echo is available for comparison.  A/P 1. Chest pain 2. pre operative cardiac exam  hx as above  no further symptoms since discharge  Etiology of CP not yet evaluated  Although trop neg, as CP sx at rest, stress testing is contraindicated. In view of resting abnormal EKG w NSST, willl proceed c CCTA defer pre op clearance for hernia pending CCTA   3. palpitations  per pt and wife, EMS noted rapid and irregular HR  EKG strips not available zio  4.  suspect sleep apnea  hx as above  in view of symptoms will obtain HST  5. elevated HgA1C Diet (reduce simple carbs (bread) and sugar in tea    --------------------------------------------------------------------- TCM visit today linked to post hospial discharge RN phone follow up as below Discussion/Summary     Followed up w/ pt wife after pt discharge from [facility]. Patient is a 73y old Male with no known pmhx BIBEMS for palpitations and chest pain. Patient states he was sitting at home when he was woken up out of his sleep with acute chest pain. They called 911 and EMS arrived and found pt HR to be "irregular" and in the 160 range. Patient states that as soon as they brought him outside in the cold he felt much better and heart rate resolved into the 60s-70s. Upon presentation to [facility], patient remained in NSR with PVCs, with no chest pain. Denies n/v/d, recent sick contacts, falls or bleeding within the last 3 months. Writer made pt wife aware should the pt begin to experience cp, sob, and dizziness they should return to the ED. Pt wife verbalized understanding. Reminded them of appt w/ Dr. Mott on 03/27.      Electronically signed by : JORDAN VERNON R.N.; Mar 20 2024 12:01PM Eastern Standard Time (Author)

## 2024-05-02 ENCOUNTER — INPATIENT (INPATIENT)
Facility: HOSPITAL | Age: 74
LOS: 1 days | Discharge: HOME | DRG: 395 | End: 2024-05-04
Attending: SURGERY | Admitting: STUDENT IN AN ORGANIZED HEALTH CARE EDUCATION/TRAINING PROGRAM
Payer: MEDICAID

## 2024-05-02 VITALS
OXYGEN SATURATION: 96 % | DIASTOLIC BLOOD PRESSURE: 92 MMHG | SYSTOLIC BLOOD PRESSURE: 183 MMHG | RESPIRATION RATE: 18 BRPM | HEART RATE: 88 BPM | HEIGHT: 66 IN | TEMPERATURE: 98 F

## 2024-05-02 DIAGNOSIS — Z98.890 OTHER SPECIFIED POSTPROCEDURAL STATES: Chronic | ICD-10-CM

## 2024-05-02 LAB
ALBUMIN SERPL ELPH-MCNC: 4.2 G/DL — SIGNIFICANT CHANGE UP (ref 3.3–5)
ALBUMIN SERPL ELPH-MCNC: 4.5 G/DL — SIGNIFICANT CHANGE UP (ref 3.3–5)
ALP SERPL-CCNC: 55 U/L — SIGNIFICANT CHANGE UP (ref 40–120)
ALP SERPL-CCNC: SIGNIFICANT CHANGE UP (ref 40–120)
ALT FLD-CCNC: 19 U/L — SIGNIFICANT CHANGE UP (ref 10–45)
ALT FLD-CCNC: SIGNIFICANT CHANGE UP (ref 10–45)
ANION GAP SERPL CALC-SCNC: 12 MMOL/L — SIGNIFICANT CHANGE UP (ref 5–17)
ANION GAP SERPL CALC-SCNC: 13 MMOL/L — SIGNIFICANT CHANGE UP (ref 5–17)
APPEARANCE UR: CLEAR — SIGNIFICANT CHANGE UP
APTT BLD: 17.2 SEC — LOW (ref 24.5–35.6)
AST SERPL-CCNC: 25 U/L — SIGNIFICANT CHANGE UP (ref 10–40)
AST SERPL-CCNC: SIGNIFICANT CHANGE UP (ref 10–40)
BASOPHILS # BLD AUTO: 0.06 K/UL — SIGNIFICANT CHANGE UP (ref 0–0.2)
BASOPHILS NFR BLD AUTO: 0.6 % — SIGNIFICANT CHANGE UP (ref 0–2)
BILIRUB SERPL-MCNC: 1.2 MG/DL — SIGNIFICANT CHANGE UP (ref 0.2–1.2)
BILIRUB SERPL-MCNC: 1.3 MG/DL — HIGH (ref 0.2–1.2)
BILIRUB UR-MCNC: NEGATIVE — SIGNIFICANT CHANGE UP
BUN SERPL-MCNC: 23 MG/DL — SIGNIFICANT CHANGE UP (ref 7–23)
BUN SERPL-MCNC: 24 MG/DL — HIGH (ref 7–23)
CALCIUM SERPL-MCNC: 9.3 MG/DL — SIGNIFICANT CHANGE UP (ref 8.4–10.5)
CALCIUM SERPL-MCNC: 9.6 MG/DL — SIGNIFICANT CHANGE UP (ref 8.4–10.5)
CHLORIDE SERPL-SCNC: 100 MMOL/L — SIGNIFICANT CHANGE UP (ref 96–108)
CHLORIDE SERPL-SCNC: 102 MMOL/L — SIGNIFICANT CHANGE UP (ref 96–108)
CO2 SERPL-SCNC: 25 MMOL/L — SIGNIFICANT CHANGE UP (ref 22–31)
CO2 SERPL-SCNC: 25 MMOL/L — SIGNIFICANT CHANGE UP (ref 22–31)
COLOR SPEC: SIGNIFICANT CHANGE UP
CREAT SERPL-MCNC: 0.76 MG/DL — SIGNIFICANT CHANGE UP (ref 0.5–1.3)
CREAT SERPL-MCNC: 0.85 MG/DL — SIGNIFICANT CHANGE UP (ref 0.5–1.3)
DIFF PNL FLD: NEGATIVE — SIGNIFICANT CHANGE UP
EGFR: 91 ML/MIN/1.73M2 — SIGNIFICANT CHANGE UP
EGFR: 94 ML/MIN/1.73M2 — SIGNIFICANT CHANGE UP
EOSINOPHIL # BLD AUTO: 0.03 K/UL — SIGNIFICANT CHANGE UP (ref 0–0.5)
EOSINOPHIL NFR BLD AUTO: 0.3 % — SIGNIFICANT CHANGE UP (ref 0–6)
GLUCOSE SERPL-MCNC: 132 MG/DL — HIGH (ref 70–99)
GLUCOSE SERPL-MCNC: 161 MG/DL — HIGH (ref 70–99)
GLUCOSE UR QL: NEGATIVE MG/DL — SIGNIFICANT CHANGE UP
HCT VFR BLD CALC: 43.8 % — SIGNIFICANT CHANGE UP (ref 39–50)
HGB BLD-MCNC: 14 G/DL — SIGNIFICANT CHANGE UP (ref 13–17)
IMM GRANULOCYTES NFR BLD AUTO: 0.4 % — SIGNIFICANT CHANGE UP (ref 0–0.9)
INR BLD: 0.92 — SIGNIFICANT CHANGE UP (ref 0.85–1.18)
KETONES UR-MCNC: NEGATIVE MG/DL — SIGNIFICANT CHANGE UP
LACTATE SERPL-SCNC: 1.7 MMOL/L — SIGNIFICANT CHANGE UP (ref 0.5–2)
LEUKOCYTE ESTERASE UR-ACNC: NEGATIVE — SIGNIFICANT CHANGE UP
LIDOCAIN IGE QN: 37 U/L — SIGNIFICANT CHANGE UP (ref 7–60)
LYMPHOCYTES # BLD AUTO: 0.45 K/UL — LOW (ref 1–3.3)
LYMPHOCYTES # BLD AUTO: 4.2 % — LOW (ref 13–44)
MAGNESIUM SERPL-MCNC: 2 MG/DL — SIGNIFICANT CHANGE UP (ref 1.6–2.6)
MCHC RBC-ENTMCNC: 30.7 PG — SIGNIFICANT CHANGE UP (ref 27–34)
MCHC RBC-ENTMCNC: 32 GM/DL — SIGNIFICANT CHANGE UP (ref 32–36)
MCV RBC AUTO: 96.1 FL — SIGNIFICANT CHANGE UP (ref 80–100)
MONOCYTES # BLD AUTO: 0.45 K/UL — SIGNIFICANT CHANGE UP (ref 0–0.9)
MONOCYTES NFR BLD AUTO: 4.2 % — SIGNIFICANT CHANGE UP (ref 2–14)
NEUTROPHILS # BLD AUTO: 9.67 K/UL — HIGH (ref 1.8–7.4)
NEUTROPHILS NFR BLD AUTO: 90.3 % — HIGH (ref 43–77)
NITRITE UR-MCNC: NEGATIVE — SIGNIFICANT CHANGE UP
NRBC # BLD: 0 /100 WBCS — SIGNIFICANT CHANGE UP (ref 0–0)
PH UR: 7 — SIGNIFICANT CHANGE UP (ref 5–8)
PLATELET # BLD AUTO: 179 K/UL — SIGNIFICANT CHANGE UP (ref 150–400)
POTASSIUM SERPL-MCNC: 4 MMOL/L — SIGNIFICANT CHANGE UP (ref 3.5–5.3)
POTASSIUM SERPL-MCNC: SIGNIFICANT CHANGE UP (ref 3.5–5.3)
POTASSIUM SERPL-SCNC: 4 MMOL/L — SIGNIFICANT CHANGE UP (ref 3.5–5.3)
POTASSIUM SERPL-SCNC: SIGNIFICANT CHANGE UP (ref 3.5–5.3)
PROT SERPL-MCNC: 6.3 G/DL — SIGNIFICANT CHANGE UP (ref 6–8.3)
PROT SERPL-MCNC: 7.2 G/DL — SIGNIFICANT CHANGE UP (ref 6–8.3)
PROT UR-MCNC: NEGATIVE MG/DL — SIGNIFICANT CHANGE UP
PROTHROM AB SERPL-ACNC: 10.5 SEC — SIGNIFICANT CHANGE UP (ref 9.5–13)
RBC # BLD: 4.56 M/UL — SIGNIFICANT CHANGE UP (ref 4.2–5.8)
RBC # FLD: 13.3 % — SIGNIFICANT CHANGE UP (ref 10.3–14.5)
SODIUM SERPL-SCNC: 138 MMOL/L — SIGNIFICANT CHANGE UP (ref 135–145)
SODIUM SERPL-SCNC: 139 MMOL/L — SIGNIFICANT CHANGE UP (ref 135–145)
SP GR SPEC: >1.03 — HIGH (ref 1–1.03)
UROBILINOGEN FLD QL: 1 MG/DL — SIGNIFICANT CHANGE UP (ref 0.2–1)
WBC # BLD: 10.7 K/UL — HIGH (ref 3.8–10.5)
WBC # FLD AUTO: 10.7 K/UL — HIGH (ref 3.8–10.5)

## 2024-05-02 PROCEDURE — 71045 X-RAY EXAM CHEST 1 VIEW: CPT | Mod: 26

## 2024-05-02 PROCEDURE — 99255 IP/OBS CONSLTJ NEW/EST HI 80: CPT

## 2024-05-02 PROCEDURE — 93010 ELECTROCARDIOGRAM REPORT: CPT

## 2024-05-02 PROCEDURE — 99285 EMERGENCY DEPT VISIT HI MDM: CPT

## 2024-05-02 PROCEDURE — 74177 CT ABD & PELVIS W/CONTRAST: CPT | Mod: 26,MC

## 2024-05-02 RX ORDER — IOHEXOL 300 MG/ML
30 INJECTION, SOLUTION INTRAVENOUS ONCE
Refills: 0 | Status: COMPLETED | OUTPATIENT
Start: 2024-05-02 | End: 2024-05-02

## 2024-05-02 RX ORDER — SODIUM CHLORIDE 9 MG/ML
1000 INJECTION, SOLUTION INTRAVENOUS
Refills: 0 | Status: DISCONTINUED | OUTPATIENT
Start: 2024-05-02 | End: 2024-05-03

## 2024-05-02 RX ORDER — HEPARIN SODIUM 5000 [USP'U]/ML
5000 INJECTION INTRAVENOUS; SUBCUTANEOUS EVERY 8 HOURS
Refills: 0 | Status: DISCONTINUED | OUTPATIENT
Start: 2024-05-02 | End: 2024-05-04

## 2024-05-02 RX ORDER — SODIUM CHLORIDE 9 MG/ML
1000 INJECTION, SOLUTION INTRAVENOUS
Refills: 0 | Status: DISCONTINUED | OUTPATIENT
Start: 2024-05-02 | End: 2024-05-02

## 2024-05-02 RX ORDER — INFLUENZA VIRUS VACCINE 15; 15; 15; 15 UG/.5ML; UG/.5ML; UG/.5ML; UG/.5ML
0.7 SUSPENSION INTRAMUSCULAR ONCE
Refills: 0 | Status: DISCONTINUED | OUTPATIENT
Start: 2024-05-02 | End: 2024-05-04

## 2024-05-02 RX ORDER — ACETAMINOPHEN 500 MG
1000 TABLET ORAL ONCE
Refills: 0 | Status: COMPLETED | OUTPATIENT
Start: 2024-05-02 | End: 2024-05-02

## 2024-05-02 RX ORDER — ONDANSETRON 8 MG/1
4 TABLET, FILM COATED ORAL EVERY 6 HOURS
Refills: 0 | Status: DISCONTINUED | OUTPATIENT
Start: 2024-05-02 | End: 2024-05-04

## 2024-05-02 RX ADMIN — IOHEXOL 30 MILLILITER(S): 300 INJECTION, SOLUTION INTRAVENOUS at 05:29

## 2024-05-02 RX ADMIN — Medication 400 MILLIGRAM(S): at 05:29

## 2024-05-02 RX ADMIN — HEPARIN SODIUM 5000 UNIT(S): 5000 INJECTION INTRAVENOUS; SUBCUTANEOUS at 21:36

## 2024-05-02 NOTE — CONSULT NOTE ADULT - ASSESSMENT
72 yo M with recent episode of palpitations and chest pain and PSHx of open R inguinal hernia repair w/ mesh (40yrs ago) with known 4m hx of L reducible inguinal hernia presented to [facility] ED with 1d hx of nausea, emesis, and abdominal pain associated with nonreducible inguinal hernia      SBO   left inguinal hernia   Preprocedural evaluation:   METS >4 by Duke Activity Status Index (DASI) , able to walk up >x flight of stairs without stopping.   Patient's Revised Cardiac Risk Index (RCRI) score is XXX  GILMORE macario-operative risk index , patient has a XXXX risk of myocardial infarction or cardiac arrest, intraoperatively or up to 30 days post-op  Patient is at XX to XX risk of  adverse perioperative cardiac events undergoing XXXX risk surgery.   Patient may proceed to surgery without further cardiac evaluation if surgery is indicated.   Please notify co-management hospitalist if patient's clinical status changes  NPo   rest of the management per primary team      hx of palpitation   pt was recently admitted for work up of palpitation and chest pain   TTE w normal left and right ventricular funciton   trops and EKG non ischemic w PAC at the time of last admission- pt was discharged with instruction to fu w cardio and have cardiac monitor x 2 weeks   recommend to repeat EKG   please obtain records from Dr. Mott     dvt ppx sqh 72 yo M with recent episode of palpitations and chest pain and PSHx of open R inguinal hernia repair w/ mesh (40yrs ago) with known 4m hx of L reducible inguinal hernia presented to [facility] ED with 1d hx of nausea, emesis, and abdominal pain associated with nonreducible inguinal hernia. Pt admitted for SBO      SBO   left inguinal hernia   hx of palpitation   Preprocedural evaluation:   METS >4 by Duke Activity Status Index (DASI) , able to walk up >2 flight of stairs without stopping but goes up slow. RCRI Class II if EKG is without q waves, pending ekg   CXR with no signs of pulm congestion   pt was recently admitted in march for work up of palpitation and chest pain   TTE w normal left and right ventricular function in march 2024   trops and EKG non ischemic w PAC at the time of last admission- pt was discharged with instruction to fu w cardio and have cardiac monitor x 2 weeks  per wife results showed PAC's and was advised to go through cardiac imaging prior to surgery.     recommend to repeat EKG and please obtain records from Dr. Mott, cardiac optimization prior to procedure with cardiology  was NPo - diet being advanced    rest of the management per primary team    dvt ppx sqh

## 2024-05-02 NOTE — CONSULT NOTE ADULT - SUBJECTIVE AND OBJECTIVE BOX
Patient is a 73y old  Male who presents with a chief complaint of Groin Pain (02 May 2024 10:30)      HPI:  73M with recent episode of palpitations and chest pain and PSHx of open R inguinal hernia repair w/ mesh (40yrs ago) with known 4m hx of L reducible inguinal hernia presented to [facility] ED with 1d hx of nausea, emesis, and abdominal pain associated with nonreducible inguinal hernia. Patient reports last flatus and BM yesterday and last emesis in ambulance to ED. Patient reports since last emesis he has no pain or nausea. On presentation, patient was afebrile, normocardic and hypertensive (180/90s). Patient labs notable for leukocytosis (10.7) with left shift, all other labs wnl including lactate (1.7) and UA unremarkable. CT A/P w/ PO and IV contrast obtained with findings of Small bowel obstruction with transition point in a left inguinal hernia and free fluid contained within hernia. [facility] surgery was called for further management of SBO secondary to incarcerated L inguinal hernia. On exam patient denies pain, nausea, or emesis. Patient states he was previously worked up for elcetive repair of L inguinal hernia at [place] including echo (3/24) with findings of mild TR, pulmonary htn (PASP 37 mmHg) however patient could not have surgery due to scheduling issues. After placing patient in trendelenberg with ice, hernia was able to be reduced without difficulty and patient was able to pass flatus and have BM.    PMHx: Denies  PSHx: R inguinal hernia repair w/ mesh (40yrs ago)  Allergies: denies  Meds: denies  Social: denies (02 May 2024 10:30)      Allergies    No Known Allergies    Intolerances        MEDICATIONS  (STANDING):  heparin   Injectable 5000 Unit(s) SubCutaneous every 8 hours    MEDICATIONS  (PRN):  ondansetron Injectable 4 milliGRAM(s) IV Push every 6 hours PRN Nausea and/or Vomiting      Daily Height in cm: 167.64 (02 May 2024 01:18)    Daily     Drug Dosing Weight  Height (cm): 167.6 (02 May 2024 01:18)  Weight (kg): 53.6 (02 May 2024 15:18)  BMI (kg/m2): 19.1 (02 May 2024 15:18)  BSA (m2): 1.6 (02 May 2024 15:18)    PAST MEDICAL & SURGICAL HISTORY:  No pertinent past medical history      H/O right inguinal hernia repair          FAMILY HISTORY:  No pertinent family history in first degree relatives          REVIEW OF SYSTEMS:    CONSTITUTIONAL: No fever, weight loss, or fatigue  EYES: No eye pain, visual disturbances, or discharge  ENMT:  No difficulty hearing, tinnitus, vertigo; No sinus or throat pain  NECK: No pain or stiffness  RESPIRATORY: No cough, wheezing, chills or hemoptysis; No shortness of breath  CARDIOVASCULAR: No chest pain, palpitations, dizziness, or leg swelling  GASTROINTESTINAL: No abdominal or epigastric pain. No nausea, vomiting,   GENITOURINARY: No dysuria, frequency, hematuria, or incontinence  LYMPH NODES: No enlarged glands  ENDOCRINE: No heat or cold intolerance; No hair loss  MUSCULOSKELETAL: No joint pain or swelling; No muscle, back, or extremity pain  NEUROLOGICAL: No headaches, memory loss, loss of strength, numbness, or tremors  SKIN: No itching, burning, rashes, or lesion              Vital Signs Last 24 Hrs  T(C): 36.8 (02 May 2024 13:45), Max: 37 (02 May 2024 06:08)  T(F): 98.2 (02 May 2024 13:45), Max: 98.6 (02 May 2024 06:08)  HR: 53 (02 May 2024 13:06) (53 - 88)  BP: 144/78 (02 May 2024 13:45) (121/76 - 183/92)  BP(mean): --  ABP: --  ABP(mean): --  RR: 18 (02 May 2024 13:45) (16 - 18)  SpO2: 98% (02 May 2024 13:45) (95% - 98%)    O2 Parameters below as of 02 May 2024 13:45  Patient On (Oxygen Delivery Method): room air                I&O's Detail      PHYSICAL EXAM:    GENERAL: NAD, well-groomed, well-developed  HEAD:  Atraumatic, Normocephalic  EYES: EOMI, PERRLA, conjunctiva and sclera clear  ENMT: No tonsillar erythema, exudates, or enlargement; Moist mucous membranes, Good dentition, No lesions  NECK: Supple, No JVD, Normal thyroid  NERVOUS SYSTEM:  Alert & Oriented X3, Good concentration; Motor Strength 5/5 B/L upper and lower extremities; DTRs 2+ intact and symmetric  CHEST/LUNG: Clear to percussion bilaterally; No rales, rhonchi, wheezing, or rubs  HEART: Regular rate and rhythm; No murmurs, rubs, or gallops  ABDOMEN: Soft, Nontender, Nondistended; Bowel sounds present  EXTREMITIES:  2+ Peripheral Pulses, No clubbing, cyanosis, or edema  LYMPH: No lymphadenopathy noted  SKIN: No rashes or lesions    LABS:  CBC Full  -  ( 02 May 2024 02:20 )  WBC Count : 10.70 K/uL  RBC Count : 4.56 M/uL  Hemoglobin : 14.0 g/dL  Hematocrit : 43.8 %  Platelet Count - Automated : 179 K/uL  Mean Cell Volume : 96.1 fl  Mean Cell Hemoglobin : 30.7 pg  Mean Cell Hemoglobin Concentration : 32.0 gm/dL  Auto Neutrophil # : 9.67 K/uL  Auto Lymphocyte # : 0.45 K/uL  Auto Monocyte # : 0.45 K/uL  Auto Eosinophil # : 0.03 K/uL  Auto Basophil # : 0.06 K/uL  Auto Neutrophil % : 90.3 %  Auto Lymphocyte % : 4.2 %  Auto Monocyte % : 4.2 %  Auto Eosinophil % : 0.3 %  Auto Basophil % : 0.6 %    05-02    139  |  102  |  23  ----------------------------<  132<H>  4.0   |  25  |  0.76    Ca    9.3      02 May 2024 03:23  Mg     2.0     05-02    TPro  6.3  /  Alb  4.2  /  TBili  1.3<H>  /  DBili  x   /  AST  25  /  ALT  19  /  AlkPhos  55  05-02    CAPILLARY BLOOD GLUCOSE        PT/INR - ( 02 May 2024 02:20 )   PT: 10.5 sec;   INR: 0.92          PTT - ( 02 May 2024 02:20 )  PTT:17.2 sec  Urinalysis Basic - ( 02 May 2024 03:23 )    Color: x / Appearance: x / SG: x / pH: x  Gluc: 132 mg/dL / Ketone: x  / Bili: x / Urobili: x   Blood: x / Protein: x / Nitrite: x   Leuk Esterase: x / RBC: x / WBC x   Sq Epi: x / Non Sq Epi: x / Bacteria: x              EKG:    ECHO, US:    RADIOLOGY:  < from: CT Abdomen and Pelvis w/ Oral Cont and w/ IV Cont (05.02.24 @ 06:44) >  Small bowel obstruction with transition point in a left inguinal hernia.    < end of copied text >   Patient is a 73y old  Male who presents with a chief complaint of Groin Pain (02 May 2024 10:30)      HPI:  73M with recent episode of palpitations and chest pain and PSHx of open R inguinal hernia repair w/ mesh (40yrs ago) with known 4m hx of L reducible inguinal hernia presented to [facility] ED with 1d hx of nausea, emesis, and abdominal pain associated with nonreducible inguinal hernia. Patient reports last flatus and BM yesterday and last emesis in ambulance to ED. Patient reports since last emesis he has no pain or nausea. On presentation, patient was afebrile, normocardic and hypertensive (180/90s). Patient labs notable for leukocytosis (10.7) with left shift, all other labs wnl including lactate (1.7) and UA unremarkable. CT A/P w/ PO and IV contrast obtained with findings of Small bowel obstruction with transition point in a left inguinal hernia and free fluid contained within hernia. [facility] surgery was called for further management of SBO secondary to incarcerated L inguinal hernia. On exam patient denies pain, nausea, or emesis. Patient states he was previously worked up for elcetive repair of L inguinal hernia at [place] including echo (3/24) with findings of mild TR, pulmonary htn (PASP 37 mmHg) however patient could not have surgery due to scheduling issues. After placing patient in trendelenberg with ice, hernia was able to be reduced without difficulty and patient was able to pass flatus and have BM.    PMHx: Denies  PSHx: R inguinal hernia repair w/ mesh (40yrs ago)  Allergies: denies  Meds: denies  Social: denies (02 May 2024 10:30)      Allergies    No Known Allergies    Intolerances        MEDICATIONS  (STANDING):  heparin   Injectable 5000 Unit(s) SubCutaneous every 8 hours    MEDICATIONS  (PRN):  ondansetron Injectable 4 milliGRAM(s) IV Push every 6 hours PRN Nausea and/or Vomiting      Daily Height in cm: 167.64 (02 May 2024 01:18)    Daily     Drug Dosing Weight  Height (cm): 167.6 (02 May 2024 01:18)  Weight (kg): 53.6 (02 May 2024 15:18)  BMI (kg/m2): 19.1 (02 May 2024 15:18)  BSA (m2): 1.6 (02 May 2024 15:18)    PAST MEDICAL & SURGICAL HISTORY:  No pertinent past medical history      H/O right inguinal hernia repair          FAMILY HISTORY:  No pertinent family history in first degree relatives          REVIEW OF SYSTEMS:    CONSTITUTIONAL: No fever, weight loss, or fatigue  EYES: No eye pain, visual disturbances, or discharge  ENMT:  No difficulty hearing, tinnitus, vertigo; No sinus or throat pain  NECK: No pain or stiffness  RESPIRATORY: No cough, wheezing, chills or hemoptysis; No shortness of breath  CARDIOVASCULAR: No chest pain, palpitations, dizziness, or leg swelling  GASTROINTESTINAL: No abdominal or epigastric pain. No nausea, vomiting, + had BM   ENDOCRINE: No heat or cold intolerance; No hair loss  MUSCULOSKELETAL: No joint pain or swelling; No muscle, back, or extremity pain  NEUROLOGICAL: No headaches, memory loss, loss of strength, numbness, or tremors  SKIN: No itching, burning, rashes, or lesion              Vital Signs Last 24 Hrs  T(C): 36.8 (02 May 2024 13:45), Max: 37 (02 May 2024 06:08)  T(F): 98.2 (02 May 2024 13:45), Max: 98.6 (02 May 2024 06:08)  HR: 53 (02 May 2024 13:06) (53 - 88)  BP: 144/78 (02 May 2024 13:45) (121/76 - 183/92)  BP(mean): --  ABP: --  ABP(mean): --  RR: 18 (02 May 2024 13:45) (16 - 18)  SpO2: 98% (02 May 2024 13:45) (95% - 98%)    O2 Parameters below as of 02 May 2024 13:45  Patient On (Oxygen Delivery Method): room air                I&O's Detail      PHYSICAL EXAM:    GENERAL: NAD, well-groomed, well-developed  HEAD:  Atraumatic, Normocephalic  EYES: EOMI, PERRLA, conjunctiva and sclera clear  ENMT: No tonsillar erythema, exudates, or enlargement; Moist mucous membranes, Good dentition, No lesions  NECK: Supple, No JVD, Normal thyroid  NERVOUS SYSTEM:  Alert & Oriented X3, Good concentration;    CHEST/LUNG: Clear to percussion bilaterally; No rales, rhonchi, wheezing, or rubs  HEART: Regular rate and rhythm; No murmurs, rubs, or gallops  ABDOMEN: Soft, Nontender, Nondistended; Bowel sounds present  EXTREMITIES:  2+ Peripheral Pulses, No clubbing, cyanosis, or edema  LYMPH: No lymphadenopathy noted  SKIN: No rashes or lesions    LABS:  CBC Full  -  ( 02 May 2024 02:20 )  WBC Count : 10.70 K/uL  RBC Count : 4.56 M/uL  Hemoglobin : 14.0 g/dL  Hematocrit : 43.8 %  Platelet Count - Automated : 179 K/uL  Mean Cell Volume : 96.1 fl  Mean Cell Hemoglobin : 30.7 pg  Mean Cell Hemoglobin Concentration : 32.0 gm/dL  Auto Neutrophil # : 9.67 K/uL  Auto Lymphocyte # : 0.45 K/uL  Auto Monocyte # : 0.45 K/uL  Auto Eosinophil # : 0.03 K/uL  Auto Basophil # : 0.06 K/uL  Auto Neutrophil % : 90.3 %  Auto Lymphocyte % : 4.2 %  Auto Monocyte % : 4.2 %  Auto Eosinophil % : 0.3 %  Auto Basophil % : 0.6 %    05-02    139  |  102  |  23  ----------------------------<  132<H>  4.0   |  25  |  0.76    Ca    9.3      02 May 2024 03:23  Mg     2.0     05-02    TPro  6.3  /  Alb  4.2  /  TBili  1.3<H>  /  DBili  x   /  AST  25  /  ALT  19  /  AlkPhos  55  05-02    CAPILLARY BLOOD GLUCOSE        PT/INR - ( 02 May 2024 02:20 )   PT: 10.5 sec;   INR: 0.92          PTT - ( 02 May 2024 02:20 )  PTT:17.2 sec  Urinalysis Basic - ( 02 May 2024 03:23 )    Color: x / Appearance: x / SG: x / pH: x  Gluc: 132 mg/dL / Ketone: x  / Bili: x / Urobili: x   Blood: x / Protein: x / Nitrite: x   Leuk Esterase: x / RBC: x / WBC x   Sq Epi: x / Non Sq Epi: x / Bacteria: x              EKG:    ECHO, US:    RADIOLOGY:  < from: CT Abdomen and Pelvis w/ Oral Cont and w/ IV Cont (05.02.24 @ 06:44) >  Small bowel obstruction with transition point in a left inguinal hernia.    < end of copied text >

## 2024-05-02 NOTE — H&P ADULT - HISTORY OF PRESENT ILLNESS
73M with no PMHx and PSHx of open R inguinal hernia repair w/ mesh (40yrs ago) with known 4m hx of L reducible inguinal hernia presented to [facility] ED with 1d hx of nausea, emesis, and abdominal pain associated with nonreducible inguinal hernia. Patient reports last flatus and BM yesterday and last emesis in ambulance to ED. Patient reports since last emesis he has no pain or nausea. On presentation, patient was afebrile, normocardic and hypertensive (180/90s). Patient labs notable for leukocytosis (10.7) with left shift, all other labs wnl including lactate (1.7) and UA unremarkable. CT A/P w/ PO and IV contrast obtained with findings of Small bowel obstruction with transition point in a left inguinal hernia and free fluid contained within hernia. [facility] surgery was called for further management of SBO secondary to incarcerated L inguinal hernia. On exam patient denies pain, nausea, or emesis. Patient states he was previously worked up for elcetive repair of L inguinal hernia at [place] including echo (3/24) with findings of mild TR, pulmonary htn (PASP 37 mmHg) however patient could not have surgery due to scheduling issues. After placing patient in trendelenberg with ice, hernia was able to be reduced without difficulty and patient was able to pass flatus and have BM.    PMHx: Denies  PSHx: R inguinal hernia repair w/ mesh (40yrs ago)  Allergies: denies  Meds: denies  Social: denies

## 2024-05-02 NOTE — H&P ADULT - ASSESSMENT
73M with no PMHx and PSHx of open R inguinal hernia repair w/ mesh (40yrs ago) with known 4m hx of L reducible inguinal hernia presented to [facility] ED with 1d hx of nausea, emesis, and abdominal pain associated with nonreducible inguinal hernia. After placing patient in trendelenberg with ice, hernia was able to be reduced without difficulty and patient was able to pass flatus and have BM.    23hr obs  NPO/IVF  Pain and nausea prn  SQH/SCD/OOB  Cards consult  AM labs

## 2024-05-02 NOTE — ED PROVIDER NOTE - PROGRESS NOTE DETAILS
pending CT results Ree: pt received from Dr. Chamorro at s/o; pt with h/o large left inguinal hernia, with left inguinal pain. Awaiting ct a/p results. Dispo pending ct results and pt re-evaluation. Will continue to monitor. chela: pt received at sign out from overnight team as pending ct -- found to have sbo, surg consulted, will admit to reg bed

## 2024-05-02 NOTE — CONSULT NOTE ADULT - ASSESSMENT
73M with no PMHx and PSHx of open R inguinal hernia repair w/ mesh (40yrs ago) with known 4m hx of L reducible inguinal hernia presented to [facility] ED with 1d hx of nausea, emesis, and abdominal pain associated with nonreducible inguinal hernia. Cardiology consulted for preop eval    Review of Studies:  ECHO 3/19/24:  Normal left and right ventricular size and systolic function. Mild tricuspid regurgitation. Pulmonary hypertension present, pulmonary artery systolic pressure is 37 mmHg.    ECG: NSR w/ RAD and LPFB      #Pre-op  RCRI: II  GILMORE: 0.1% risk of MI or cardiac arrest, intraoperatively or up to 30 days post-op    METS >4  No ADHF  No ACS  No critical valvular disease  No unstable arrhythmias   Pt is optimized from a cardiology standpoint and should proceed with this urgent surgery. Pt is low-risk for an intermediate-risk operation.  - Follow-up with Dr. Mott as an outpatient upon discharge for non-urgent ischemic evaluation with CCTA         Recommendations above are preliminary pending discussion with an attending cardiologist  We'll continue to follow, thank you for the consultation      Rebel Brooks ([id number])  Cardiovascular Disease Fellow

## 2024-05-02 NOTE — ED PROVIDER NOTE - CLINICAL SUMMARY MEDICAL DECISION MAKING FREE TEXT BOX
hx of left inguinal hernia, tonight with worsening pain, n/v, tender hernia, nonreducible. afebrile. possible incarceration vs strangulation vs sbo  -check labs  -tylenol  -CT a/p

## 2024-05-02 NOTE — PATIENT PROFILE ADULT - HAVE YOU EXPERIENCED VIOLENCE OR A TRAUMATIC EVENT?
no none

## 2024-05-02 NOTE — ED PROVIDER NOTE - OBJECTIVE STATEMENT
73M no PMH c/o left groin pain. pt states has known inguinal hernia, states was supposed to have surgery on hernia 3 different times. pt recently admitted to hospital for palpitations with elevated HR. states tonight had worsening groin pain. states hernia always comes out and rarely completely reduces. states vomited tonight. no fevers. no diarrhea.

## 2024-05-02 NOTE — H&P ADULT - NSHPPHYSICALEXAM_GEN_ALL_CORE
T(C): 36.9 (05-02-24 @ 06:30), Max: 37 (05-02-24 @ 06:08)  HR: 80 (05-02-24 @ 06:30) (66 - 88)  BP: 127/77 (05-02-24 @ 06:30) (121/76 - 183/92)  RR: 18 (05-02-24 @ 06:30) (16 - 18)  SpO2: 96% (05-02-24 @ 06:30) (95% - 96%)    CONSTITUTIONAL: Well groomed, no apparent distress  RESP: No respiratory distress, no use of accessory muscles; CTA b/l, no WRR  CV: RRR, no JVD; no peripheral edema  GI: Soft, NT, ND, no rebound, no guarding; L inguinal hernia palpated with reducible contents, no pain/tenderness/skin changes  SKIN: No rashes or ulcers noted; no subcutaneous nodules or induration palpable  NEURO: CN II-XII intact; normal reflexes in upper and lower extremities, sensation intact in upper and lower extremities b/l to light touch   PSYCH: Appropriate insight/judgment; A+O x 3, mood and affect appropriate, recent/remote memory intact

## 2024-05-02 NOTE — ED ADULT NURSE NOTE - OBJECTIVE STATEMENT
pt c/o inguinal hernia/ groin pain. pt has known hernia, and needs surgical intervention but surgeries has been rescheduled multiple times. lungs clear bilaterally upon aucultation. no use of accessory muscles noted. Denies any chest pain or discomfort at this time.

## 2024-05-02 NOTE — ED ADULT NURSE REASSESSMENT NOTE - NS ED NURSE REASSESS COMMENT FT1
Received report from CHARO Schumacher. Received patient in stretcher. AOX4. Vital signs as noted in flowsheet. Patient denies chest pain, pain, discomfort, shortness of breath, difficulty breathing and any form of distress not noted. Patient oriented to ED area. Plan of care discussed and verbalized understanding. All needs attended. Purposeful proactive hourly rounding in progress.

## 2024-05-02 NOTE — CONSULT NOTE ADULT - ATTENDING COMMENTS
73M with no PMHx and PSHx of open R inguinal hernia repair w/ mesh (40yrs ago) with known 4m hx of L reducible inguinal hernia presented to [facility] ED with 1d hx of nausea, emesis, and abdominal pain associated with nonreducible inguinal hernia. Cardiology consulted for preop eval    Review of Studies:  ECHO 3/19/24:  Normal left and right ventricular size and systolic function. Mild tricuspid regurgitation. Pulmonary hypertension present, pulmonary artery systolic pressure is 37 mmHg.    Pt known to me from recent out pt evaluation. Admitted [facility] w unclear episode of tachycardia ( by EMS, no EKG tracing available) and CP. In hosp, trop negative. Was discharged w plan for out pt w/u, w recommendation for CCTA.  Now admitted w incarcerated hernia, now reduced, plan for OR today. At this point, as no further CP, exercise tolerance >> 4 METS, echo nl as above, trop negative during recent admit, would proceed directly to laparascopic hernia repair. Can again defer CCTA, as priority at this point is to repair hernia: moreover, PCI is contraindicated now, as any revascularization would mandate at least one month of DAPT which cannot be interrupted.

## 2024-05-02 NOTE — H&P ADULT - NSHPLABSRESULTS_GEN_ALL_CORE
LABS:                        14.0   10.70 )-----------( 179      ( 02 May 2024 02:20 )             43.8     05-02    139  |  102  |  23  ----------------------------<  132<H>  4.0   |  25  |  0.76    Ca    9.3      02 May 2024 03:23  Mg     2.0     05-02    TPro  6.3  /  Alb  4.2  /  TBili  1.3<H>  /  DBili  x   /  AST  25  /  ALT  19  /  AlkPhos  55  05-02    PT/INR - ( 02 May 2024 02:20 )   PT: 10.5 sec;   INR: 0.92          PTT - ( 02 May 2024 02:20 )  PTT:17.2 sec    CT Abdomen and Pelvis w/ Oral Cont and w/ IV Cont:   ACC: [id number] EXAM:  CT ABDOMEN AND PELVIS OC IC   ORDERED BY: JOSE BOYLE     PROCEDURE DATE:  05/02/2024          INTERPRETATION:  CLINICAL INFORMATION: Left inguinal hernia.    COMPARISON: None.    CONTRAST/COMPLICATIONS:  IV Contrast: Isovue 370  95 cc administered   5 cc discarded  Oral Contrast: Omnipaque 300  Complications: None reported at time of study completion    PROCEDURE:  CT of the Abdomen and Pelvis was performed.  Sagittal and coronal reformats were performed.    FINDINGS:  LOWER CHEST: Cardiomegaly.    LIVER: Hypervascular lesion in segment 4A, measuring 1.7 cm (series 3,   image 19). Additional subcentimeter hypodense hepatic lesions, too small   to characterize.  BILE DUCTS: Normal caliber.  GALLBLADDER: Within normal limits.  SPLEEN: Within normal limits.  PANCREAS: Within normal limits.  ADRENALS: Within normal limits.  KIDNEYS/URETERS: No hydronephrosis. Right renal cyst. Additional   subcentimeter hypodense bilateral renal lesions, too small to   characterize.    BLADDER: Within normal limits.  REPRODUCTIVE ORGANS: Prostate is enlarged.    BOWEL: Left inguinal hernia containing obstructed small bowel and a small   amount of free fluid.  PERITONEUM: Small amount of free fluid in a left inguinal hernia sac.  VESSELS: Atherosclerotic changes.  RETROPERITONEUM/LYMPH NODES: No lymphadenopathy.  ABDOMINAL WALL: Left inguinal hernia containing a loop of obstructed   small bowel.  BONES: Degenerative changes. Dense sclerotic lesion in the left iliac   bone.    IMPRESSION:  Small bowel obstruction with transition point in a left inguinal hernia.    --- End of Report ---            GRZEGORZ NESS MD; Attending Radiologist  This document has been electronically signed. May  2 2024  7:08AM (05-02-24 @ 06:44)

## 2024-05-02 NOTE — CONSULT NOTE ADULT - SUBJECTIVE AND OBJECTIVE BOX
CHIEF COMPLAINT:    HPI:  73M with no PMHx and PSHx of open R inguinal hernia repair w/ mesh (40yrs ago) with known 4m hx of L reducible inguinal hernia presented to [facility] ED with 1d hx of nausea, emesis, and abdominal pain associated with nonreducible inguinal hernia. Patient reports last flatus and BM yesterday and last emesis in ambulance to ED. Patient reports since last emesis he has no pain or nausea. On presentation, patient was afebrile, normocardic and hypertensive (180/90s). Patient labs notable for leukocytosis (10.7) with left shift, all other labs wnl including lactate (1.7) and UA unremarkable. CT A/P w/ PO and IV contrast obtained with findings of Small bowel obstruction with transition point in a left inguinal hernia and free fluid contained within hernia. [facility] surgery was called for further management of SBO secondary to incarcerated L inguinal hernia. On exam patient denies pain, nausea, or emesis. Patient states he was previously worked up for elcetive repair of L inguinal hernia at [place] including echo (3/24) with findings of mild TR, pulmonary htn (PASP 37 mmHg) however patient could not have surgery due to scheduling issues. After placing patient in trendelenberg with ice, hernia was able to be reduced without difficulty and patient was able to pass flatus and have BM.    Consultant HPI: Pt with no prior cardiac hx outside of 1 day admission for tachycardia and CP. ECG was normal upon arrival to the hospital on that admission with negative troponin and no repeat CP. Since then the patient has had no repeat episodes of palpitations or CP. Admits to excellent exertional capacity, able to walk 10s of blocks without issue and climb stairs without any sob or CP. Denies any orthopnea ot LE swelling.      PMHx: Denies  PSHx: R inguinal hernia repair w/ mesh (40yrs ago)  Allergies: denies  Meds: denies  Social: denies (02 May 2024 10:30)    PAST MEDICAL & SURGICAL HISTORY:  No pertinent past medical history      H/O right inguinal hernia repair        [ ] Diabetes   [ ] Hypertension  [ ] Hyperlipidemia  [ ] CAD  [ ] PCI  [ ] CABG    PREVIOUS DIAGNOSTIC TESTING:    [ ] Echocardiogram:  [ ] Stress Test:  [ ] Catheterization: 	    FAMILY HISTORY:  No pertinent family history in first degree relatives      SOCIAL HISTORY:    [ ] Non-smoker  [ ] Current Smoker  [ ] Former Smoker  [ ] Alcohol Use  [ ] Drug Use    ALLERGIES/INTOLERANCES:  No Known Allergies    HOME MEDICATIONS:    INPATIENT MEDICATIONS:    heparin   Injectable 5000 Unit(s) SubCutaneous every 8 hours    influenza  Vaccine (HIGH DOSE) 0.7 milliLiter(s) IntraMuscular once  lactated ringers. 1000 milliLiter(s) IV Continuous <Continuous>  ondansetron Injectable 4 milliGRAM(s) IV Push every 6 hours PRN      REVIEW OF SYSTEMS:      [x ] All other review of systems are negative unless indicated above.  [ ] Unable to obtain due to:    PHYSICAL EXAM:    T(C): 36.6 (05-02-24 @ 20:20), Max: 37 (05-02-24 @ 06:08)  HR: 51 (05-02-24 @ 20:20) (51 - 88)  BP: 115/66 (05-02-24 @ 20:20) (115/66 - 183/92)  RR: 18 (05-02-24 @ 20:20) (16 - 18)  SpO2: 98% (05-02-24 @ 20:20) (95% - 98%)  Wt(kg): --    I&O's Summary    02 May 2024 07:01  -  02 May 2024 23:25  --------------------------------------------------------  IN: 0 mL / OUT: 125 mL / NET: -125 mL    GENERAL: NAD  HEENT: No JVD  CARDIOVASCULAR: RRR, normal S1 S2, no M/R/G,  RESPIRATORY: Lungs clear to auscultation b/l, no C/W/R  VASCULAR: Peripheral pulses palpable 2+ bilaterally  EXTREMITIES: Warm. No edema    LINES:    TELEMETRY: N/A	      ECG: NSR w/ RAD and LPFB    	  LABS:                        14.0   10.70 )-----------( 179      ( 02 May 2024 02:20 )             43.8     05-02    139  |  102  |  23  ----------------------------<  132<H>  4.0   |  25  |  0.76    Ca    9.3      02 May 2024 03:23  Mg     2.0     05-02    TPro  6.3  /  Alb  4.2  /  TBili  1.3<H>  /  DBili  x   /  AST  25  /  ALT  19  /  AlkPhos  55  05-02      Lipid Profile:   HgA1c:   TSH:     CARDIAC MARKERS:          proBNP:     RADIOLOGY:      ASSESSMENT/PLAN:

## 2024-05-02 NOTE — ED ADULT NURSE REASSESSMENT NOTE - NS ED NURSE REASSESS COMMENT FT1
Pt refusing to sign admission paperwork at this time. States they are waiting on their doctor to discuss.

## 2024-05-02 NOTE — ED ADULT TRIAGE NOTE - ARRIVAL INFO ADDITIONAL COMMENTS
pt has had an left inguinal hernia for 6 months and each time surgery was scheduled it was cancelled for various reasons.   brandon developed severe pain and n/v.

## 2024-05-03 ENCOUNTER — TRANSCRIPTION ENCOUNTER (OUTPATIENT)
Age: 74
End: 2024-05-03

## 2024-05-03 VITALS
SYSTOLIC BLOOD PRESSURE: 127 MMHG | DIASTOLIC BLOOD PRESSURE: 79 MMHG | HEART RATE: 59 BPM | RESPIRATION RATE: 18 BRPM | TEMPERATURE: 98 F | OXYGEN SATURATION: 95 %

## 2024-05-03 LAB
ANION GAP SERPL CALC-SCNC: 13 MMOL/L — SIGNIFICANT CHANGE UP (ref 5–17)
APTT BLD: 27.5 SEC — SIGNIFICANT CHANGE UP (ref 24.5–35.6)
BASOPHILS # BLD AUTO: 0.05 K/UL — SIGNIFICANT CHANGE UP (ref 0–0.2)
BASOPHILS NFR BLD AUTO: 0.9 % — SIGNIFICANT CHANGE UP (ref 0–2)
BLD GP AB SCN SERPL QL: NEGATIVE — SIGNIFICANT CHANGE UP
BLD GP AB SCN SERPL QL: NEGATIVE — SIGNIFICANT CHANGE UP
BUN SERPL-MCNC: 15 MG/DL — SIGNIFICANT CHANGE UP (ref 7–23)
CALCIUM SERPL-MCNC: 8.7 MG/DL — SIGNIFICANT CHANGE UP (ref 8.4–10.5)
CHLORIDE SERPL-SCNC: 104 MMOL/L — SIGNIFICANT CHANGE UP (ref 96–108)
CO2 SERPL-SCNC: 22 MMOL/L — SIGNIFICANT CHANGE UP (ref 22–31)
CREAT SERPL-MCNC: 0.65 MG/DL — SIGNIFICANT CHANGE UP (ref 0.5–1.3)
EGFR: 98 ML/MIN/1.73M2 — SIGNIFICANT CHANGE UP
EOSINOPHIL # BLD AUTO: 0.14 K/UL — SIGNIFICANT CHANGE UP (ref 0–0.5)
EOSINOPHIL NFR BLD AUTO: 2.6 % — SIGNIFICANT CHANGE UP (ref 0–6)
GLUCOSE SERPL-MCNC: 84 MG/DL — SIGNIFICANT CHANGE UP (ref 70–99)
HCT VFR BLD CALC: 39.6 % — SIGNIFICANT CHANGE UP (ref 39–50)
HGB BLD-MCNC: 12.9 G/DL — LOW (ref 13–17)
IMM GRANULOCYTES NFR BLD AUTO: 0.4 % — SIGNIFICANT CHANGE UP (ref 0–0.9)
INR BLD: 0.95 — SIGNIFICANT CHANGE UP (ref 0.85–1.18)
LYMPHOCYTES # BLD AUTO: 1.41 K/UL — SIGNIFICANT CHANGE UP (ref 1–3.3)
LYMPHOCYTES # BLD AUTO: 26.2 % — SIGNIFICANT CHANGE UP (ref 13–44)
MAGNESIUM SERPL-MCNC: 1.7 MG/DL — SIGNIFICANT CHANGE UP (ref 1.6–2.6)
MCHC RBC-ENTMCNC: 31 PG — SIGNIFICANT CHANGE UP (ref 27–34)
MCHC RBC-ENTMCNC: 32.6 GM/DL — SIGNIFICANT CHANGE UP (ref 32–36)
MCV RBC AUTO: 95.2 FL — SIGNIFICANT CHANGE UP (ref 80–100)
MONOCYTES # BLD AUTO: 0.4 K/UL — SIGNIFICANT CHANGE UP (ref 0–0.9)
MONOCYTES NFR BLD AUTO: 7.4 % — SIGNIFICANT CHANGE UP (ref 2–14)
NEUTROPHILS # BLD AUTO: 3.37 K/UL — SIGNIFICANT CHANGE UP (ref 1.8–7.4)
NEUTROPHILS NFR BLD AUTO: 62.5 % — SIGNIFICANT CHANGE UP (ref 43–77)
NRBC # BLD: 0 /100 WBCS — SIGNIFICANT CHANGE UP (ref 0–0)
PHOSPHATE SERPL-MCNC: 3.4 MG/DL — SIGNIFICANT CHANGE UP (ref 2.5–4.5)
PLATELET # BLD AUTO: 140 K/UL — LOW (ref 150–400)
POTASSIUM SERPL-MCNC: 4.2 MMOL/L — SIGNIFICANT CHANGE UP (ref 3.5–5.3)
POTASSIUM SERPL-SCNC: 4.2 MMOL/L — SIGNIFICANT CHANGE UP (ref 3.5–5.3)
PROTHROM AB SERPL-ACNC: 10.9 SEC — SIGNIFICANT CHANGE UP (ref 9.5–13)
RBC # BLD: 4.16 M/UL — LOW (ref 4.2–5.8)
RBC # FLD: 13.6 % — SIGNIFICANT CHANGE UP (ref 10.3–14.5)
RH IG SCN BLD-IMP: POSITIVE — SIGNIFICANT CHANGE UP
RH IG SCN BLD-IMP: POSITIVE — SIGNIFICANT CHANGE UP
SODIUM SERPL-SCNC: 139 MMOL/L — SIGNIFICANT CHANGE UP (ref 135–145)
WBC # BLD: 5.39 K/UL — SIGNIFICANT CHANGE UP (ref 3.8–10.5)
WBC # FLD AUTO: 5.39 K/UL — SIGNIFICANT CHANGE UP (ref 3.8–10.5)

## 2024-05-03 PROCEDURE — 80053 COMPREHEN METABOLIC PANEL: CPT

## 2024-05-03 PROCEDURE — 86900 BLOOD TYPING SEROLOGIC ABO: CPT

## 2024-05-03 PROCEDURE — 85025 COMPLETE CBC W/AUTO DIFF WBC: CPT

## 2024-05-03 PROCEDURE — 84100 ASSAY OF PHOSPHORUS: CPT

## 2024-05-03 PROCEDURE — 74177 CT ABD & PELVIS W/CONTRAST: CPT | Mod: MC

## 2024-05-03 PROCEDURE — 81003 URINALYSIS AUTO W/O SCOPE: CPT

## 2024-05-03 PROCEDURE — 83605 ASSAY OF LACTIC ACID: CPT

## 2024-05-03 PROCEDURE — 99254 IP/OBS CNSLTJ NEW/EST MOD 60: CPT

## 2024-05-03 PROCEDURE — 93005 ELECTROCARDIOGRAM TRACING: CPT

## 2024-05-03 PROCEDURE — 85610 PROTHROMBIN TIME: CPT

## 2024-05-03 PROCEDURE — 85730 THROMBOPLASTIN TIME PARTIAL: CPT

## 2024-05-03 PROCEDURE — 86850 RBC ANTIBODY SCREEN: CPT

## 2024-05-03 PROCEDURE — 36415 COLL VENOUS BLD VENIPUNCTURE: CPT

## 2024-05-03 PROCEDURE — 99232 SBSQ HOSP IP/OBS MODERATE 35: CPT

## 2024-05-03 PROCEDURE — 86901 BLOOD TYPING SEROLOGIC RH(D): CPT

## 2024-05-03 PROCEDURE — 80048 BASIC METABOLIC PNL TOTAL CA: CPT

## 2024-05-03 PROCEDURE — 83735 ASSAY OF MAGNESIUM: CPT

## 2024-05-03 PROCEDURE — 83690 ASSAY OF LIPASE: CPT

## 2024-05-03 PROCEDURE — 71045 X-RAY EXAM CHEST 1 VIEW: CPT

## 2024-05-03 PROCEDURE — 96374 THER/PROPH/DIAG INJ IV PUSH: CPT

## 2024-05-03 PROCEDURE — 99285 EMERGENCY DEPT VISIT HI MDM: CPT | Mod: 25

## 2024-05-03 RX ORDER — MAGNESIUM SULFATE 500 MG/ML
2 VIAL (ML) INJECTION ONCE
Refills: 0 | Status: COMPLETED | OUTPATIENT
Start: 2024-05-03 | End: 2024-05-03

## 2024-05-03 RX ORDER — ACETAMINOPHEN 500 MG
650 TABLET ORAL EVERY 6 HOURS
Refills: 0 | Status: DISCONTINUED | OUTPATIENT
Start: 2024-05-03 | End: 2024-05-04

## 2024-05-03 RX ADMIN — Medication 25 GRAM(S): at 09:33

## 2024-05-03 RX ADMIN — HEPARIN SODIUM 5000 UNIT(S): 5000 INJECTION INTRAVENOUS; SUBCUTANEOUS at 14:21

## 2024-05-03 RX ADMIN — SODIUM CHLORIDE 90 MILLILITER(S): 9 INJECTION, SOLUTION INTRAVENOUS at 00:01

## 2024-05-03 RX ADMIN — HEPARIN SODIUM 5000 UNIT(S): 5000 INJECTION INTRAVENOUS; SUBCUTANEOUS at 06:43

## 2024-05-03 NOTE — DISCHARGE NOTE PROVIDER - NSDCFUADDINST_GEN_ALL_CORE_FT
General Discharge Instructions:  Please resume all regular home medications unless specifically advised not to take a particular medication. Also, please take any new medications as prescribed.  Please get plenty of rest, continue to ambulate several times per day, and drink adequate amounts of fluids.   Please follow-up with your surgeon and Primary Care Provider (PCP) in 1-2 weeks.

## 2024-05-03 NOTE — PROGRESS NOTE ADULT - ASSESSMENT
73M with no PMHx and PSHx of open R inguinal hernia repair w/ mesh (40yrs ago) with known 4m hx of L reducible inguinal hernia presented to [facility] ED with 1d hx of nausea, emesis, and abdominal pain associated with reducible inguinal hernia.      Reg diet, NPO @ mn/IVF  Pain and nausea prn  SQH/SCD/OOB  AM labs     73M with no PMHx and PSHx of open R inguinal hernia repair w/ mesh (40yrs ago) with known 4m hx of L reducible inguinal hernia presented to [facility] ED with 1d hx of nausea, emesis, and abdominal pain associated with reducible inguinal hernia.      Reg diet  Pain and nausea prn  SQH/SCD/OOB  AM labs

## 2024-05-03 NOTE — PROGRESS NOTE ADULT - SUBJECTIVE AND OBJECTIVE BOX
SUBJECTIVE: Patient examined bedside, NAC. Pain well controlled, denies n/v. +F.      MEDICATIONS  (STANDING):  heparin   Injectable 5000 Unit(s) SubCutaneous every 8 hours  influenza  Vaccine (HIGH DOSE) 0.7 milliLiter(s) IntraMuscular once  lactated ringers. 1000 milliLiter(s) (90 mL/Hr) IV Continuous <Continuous>    MEDICATIONS  (PRN):  ondansetron Injectable 4 milliGRAM(s) IV Push every 6 hours PRN Nausea and/or Vomiting      Vital Signs Last 24 Hrs  T(C): 36.8 (03 May 2024 10:00), Max: 36.9 (02 May 2024 13:06)  T(F): 98.2 (03 May 2024 10:00), Max: 98.4 (02 May 2024 13:06)  HR: 51 (03 May 2024 10:00) (48 - 55)  BP: 147/68 (03 May 2024 10:00) (112/64 - 157/90)  BP(mean): 95 (03 May 2024 10:00) (95 - 95)  RR: 18 (03 May 2024 10:00) (17 - 18)  SpO2: 98% (03 May 2024 10:00) (95% - 98%)    Parameters below as of 03 May 2024 10:00  Patient On (Oxygen Delivery Method): room air        PHYSICAL EXAM:  CONSTITUTIONAL: Well groomed, no apparent distress  RESP: No respiratory distress, no use of accessory muscles; CTA b/l, no WRR  CV: RRR, no JVD; no peripheral edema  GI: Soft, NT, ND, no rebound, no guarding; L inguinal hernia palpated with reducible contents, no pain/tenderness/skin changes  SKIN: No rashes or ulcers noted; no subcutaneous nodules or induration palpable  NEURO: CN II-XII intact; normal reflexes in upper and lower extremities, sensation intact in upper and lower extremities b/l to light touch   PSYCH: Appropriate insight/judgment; A+O x 3, mood and affect appropriate, recent/remote memory intact                  I&O's Detail    02 May 2024 07:01  -  03 May 2024 07:00  --------------------------------------------------------  IN:  Total IN: 0 mL    OUT:    Voided (mL): 500 mL  Total OUT: 500 mL    Total NET: -500 mL      03 May 2024 07:01  -  03 May 2024 11:04  --------------------------------------------------------  IN:    Lactated Ringers: 360 mL  Total IN: 360 mL    OUT:    Oral Fluid: 0 mL  Total OUT: 0 mL    Total NET: 360 mL          LABS:                        12.9   5.39  )-----------( 140      ( 03 May 2024 05:30 )             39.6     05-03    139  |  104  |  15  ----------------------------<  84  4.2   |  22  |  0.65    Ca    8.7      03 May 2024 05:30  Phos  3.4     05-03  Mg     1.7     05-03    TPro  6.3  /  Alb  4.2  /  TBili  1.3<H>  /  DBili  x   /  AST  25  /  ALT  19  /  AlkPhos  55  05-02    PT/INR - ( 03 May 2024 05:30 )   PT: 10.9 sec;   INR: 0.95          PTT - ( 03 May 2024 05:30 )  PTT:27.5 sec  Urinalysis Basic - ( 03 May 2024 05:30 )    Color: x / Appearance: x / SG: x / pH: x  Gluc: 84 mg/dL / Ketone: x  / Bili: x / Urobili: x   Blood: x / Protein: x / Nitrite: x   Leuk Esterase: x / RBC: x / WBC x   Sq Epi: x / Non Sq Epi: x / Bacteria: x        RADIOLOGY & ADDITIONAL STUDIES:

## 2024-05-03 NOTE — PROGRESS NOTE ADULT - SUBJECTIVE AND OBJECTIVE BOX
Patient is a 73y old  Male who presents with a chief complaint of Groin Pain (03 May 2024 11:03)      INTERVAL HPI/OVERNIGHT EVENTS: offers no new complaints; current symptoms resolving    MEDICATIONS  (STANDING):  heparin   Injectable 5000 Unit(s) SubCutaneous every 8 hours  influenza  Vaccine (HIGH DOSE) 0.7 milliLiter(s) IntraMuscular once  lactated ringers. 1000 milliLiter(s) (90 mL/Hr) IV Continuous <Continuous>    MEDICATIONS  (PRN):  ondansetron Injectable 4 milliGRAM(s) IV Push every 6 hours PRN Nausea and/or Vomiting      __________________________________________________  REVIEW OF SYSTEMS:    CONSTITUTIONAL: No fever,   EYES: no acute visual disturbances  NECK: No pain or stiffness  RESPIRATORY: No cough; No shortness of breath  CARDIOVASCULAR: No chest pain, no palpitations  GASTROINTESTINAL: No pain. No nausea or vomiting; No diarrhea   NEUROLOGICAL: No headache or numbness, no tremors  MUSCULOSKELETAL: No joint pain, no muscle pain  GENITOURINARY: no dysuria, no frequency, no hesitancy  PSYCHIATRY: no depression , no anxiety  ALL OTHER  ROS negative        Vital Signs Last 24 Hrs  T(C): 36.7 (03 May 2024 13:32), Max: 36.8 (02 May 2024 23:58)  T(F): 98 (03 May 2024 13:32), Max: 98.3 (02 May 2024 23:58)  HR: 51 (03 May 2024 13:32) (48 - 55)  BP: 142/76 (03 May 2024 13:32) (112/64 - 157/90)  BP(mean): 98 (03 May 2024 13:32) (95 - 98)  RR: 18 (03 May 2024 13:32) (17 - 18)  SpO2: 96% (03 May 2024 13:32) (95% - 98%)    Parameters below as of 03 May 2024 13:32  Patient On (Oxygen Delivery Method): room air        ________________________________________________  PHYSICAL EXAM:  GENERAL: NAD  HEENT: Normocephalic;  conjunctivae and sclerae clear; moist mucous membranes;   NECK : supple  CHEST/LUNG: Clear to auscultation bilaterally with good air entry   HEART: S1 S2  regular; no murmurs, gallops or rubs  ABDOMEN: Soft, Nontender, Nondistended; Bowel sounds present  EXTREMITIES: no cyanosis; no edema; no calf tenderness  SKIN: warm and dry; no rash  NERVOUS SYSTEM:  Awake and alert; Oriented  to place, person and time ; no new deficits    _________________________________________________  LABS:                        12.9   5.39  )-----------( 140      ( 03 May 2024 05:30 )             39.6     05-03    139  |  104  |  15  ----------------------------<  84  4.2   |  22  |  0.65    Ca    8.7      03 May 2024 05:30  Phos  3.4     05-03  Mg     1.7     05-03    TPro  6.3  /  Alb  4.2  /  TBili  1.3<H>  /  DBili  x   /  AST  25  /  ALT  19  /  AlkPhos  55  05-02    PT/INR - ( 03 May 2024 05:30 )   PT: 10.9 sec;   INR: 0.95          PTT - ( 03 May 2024 05:30 )  PTT:27.5 sec  Urinalysis Basic - ( 03 May 2024 05:30 )    Color: x / Appearance: x / SG: x / pH: x  Gluc: 84 mg/dL / Ketone: x  / Bili: x / Urobili: x   Blood: x / Protein: x / Nitrite: x   Leuk Esterase: x / RBC: x / WBC x   Sq Epi: x / Non Sq Epi: x / Bacteria: x      CAPILLARY BLOOD GLUCOSE            RADIOLOGY & ADDITIONAL TESTS:      Plan of care was discussed with patient and /or primary care giver; all questions and concerns were addressed and care was aligned with patient's wishes.

## 2024-05-03 NOTE — DISCHARGE NOTE PROVIDER - HOSPITAL COURSE
73M with no past medical history or past surgical history of open right inguinal hernia repair with mesh (40yrs ago) with known 4 month history of left reducible inguinal hernia presented to [facility] ED with 1d hx of nausea, emesis, and abdominal pain associated with nonreducible inguinal hernia. Patient reported last flatus and BM 1 day ago and last emesis in ambulance to ED. Patient reports since last emesis he has no pain or nausea. On presentation, patient was afebrile, and hypertensive (180/90s). Patient labs notable for leukocytosis (10.7) with left shift, all other labs wnl including lactate (1.7) and UA unremarkable. CT A/P w/ PO and IV contrast obtained with findings of Small bowel obstruction with transition point in a left inguinal hernia and free fluid contained within hernia. The patient was admitted on 5/2 for further management of SBO secondary to incarcerated left inguinal hernia. After placing patient in trendelenberg with ice, hernia was able to be reduced without difficulty and patient was able to pass flatus and have BM. While admitted, the patient's obstructive symptoms resolved, with resolution of nausea and vomiting, he was passing regular flatus and having bowel movements. Cardiology assessed the patient for risk stratification and determined that the patient is low-risk for an intermediate-risk operation.      LAST UPDATED 5/3 73M with no past medical history or past surgical history of open right inguinal hernia repair with mesh (40yrs ago) with known 4 month history of left reducible inguinal hernia presented to [facility] ED with 1d hx of nausea, emesis, and abdominal pain associated with nonreducible inguinal hernia. Patient reported last flatus and BM 1 day ago and last emesis in ambulance to ED. Patient reports since last emesis he has no pain or nausea. On presentation, patient was afebrile, and hypertensive (180/90s). Patient labs notable for leukocytosis (10.7) with left shift, all other labs wnl including lactate (1.7) and UA unremarkable. CT A/P w/ PO and IV contrast obtained with findings of Small bowel obstruction with transition point in a left inguinal hernia and free fluid contained within hernia. The patient was admitted on 5/2 for further management of SBO secondary to incarcerated left inguinal hernia. After placing patient in Trendelenburg with ice, hernia was able to be reduced without difficulty and patient was able to pass flatus and have BM. While admitted, the patient's obstructive symptoms resolved, with resolution of nausea and vomiting, he was passing regular flatus and having bowel movements. Cardiology assessed the patient for risk stratification and determined that the patient is low-risk for an intermediate-risk operation. The patients case was cancelled on 5/3 due to operating room scheduling conflicts, patient was initially agreeable to stay for surgery however decided to go home with out-patient follow up.

## 2024-05-03 NOTE — PROGRESS NOTE ADULT - ASSESSMENT
72 yo M with recent episode of palpitations and chest pain and PSHx of open R inguinal hernia repair w/ mesh (40yrs ago) with known 4m hx of L reducible inguinal hernia presented to [facility] ED with 1d hx of nausea, emesis, and abdominal pain associated with nonreducible inguinal hernia. Pt admitted for SBO      SBO   left inguinal hernia   hx of palpitation   Preprocedural evaluation:   METS >4 by Duke Activity Status Index (DASI) , able to walk up >2 flight of stairs without stopping but goes up slow. RCRI Class II    CXR with no signs of pulm congestion   pt was recently admitted in march for work up of palpitation and chest pain   TTE w normal left and right ventricular function in march 2024; trops and EKG non ischemic w PAC at the time of last admission- pt was discharged with instruction to fu w cardio and have cardiac monitor x 2 weeks, per wife results showed PAC's and was advised to go through cardiac imaging prior to surgery.     pt will fu w   Dr. Mott  for non-urgent ischemic evaluation with CCTA  Pt seen by cardio inpt and  optimized for surgery   NPo - for OR today   rest of the management per primary team    dvt ppx sqh

## 2024-05-03 NOTE — DISCHARGE NOTE PROVIDER - ATTENDING ATTESTATION STATEMENT
I have personally seen and examined the patient. I have collaborated with and supervised the
Patient

## 2024-05-03 NOTE — DISCHARGE NOTE PROVIDER - NSDCFUSCHEDAPPT_GEN_ALL_CORE_FT
Saurav Barahona Physician [facility]  [address]  Scheduled Appointment: 05/09/2024

## 2024-05-03 NOTE — DISCHARGE NOTE PROVIDER - CARE PROVIDER_API CALL
Ynaet Davis   Surgery  [address], [address]  [place] [address]  Phone: [phone]  Fax: [phone]  Follow Up Time:

## 2024-05-04 ENCOUNTER — TRANSCRIPTION ENCOUNTER (OUTPATIENT)
Age: 74
End: 2024-05-04

## 2024-05-04 NOTE — CHART NOTE - NSCHARTNOTEFT_GEN_A_CORE
Pt and wife initially agreeable to stay ON for possible surgery tomorrow vs next week. However, in the afternoon pt and wife asking for guaranteed confirmed OR time for following day, offered to stay with OR tomorrow with Dr. Davis however pt would now like to be discharged home and follow up out-patient. Pt and wife initially agreeable to stay ON for possible surgery tomorrow vs next week. However, in the afternoon pt and wife asking for guaranteed confirmed OR time for following day, offered to stay with OR tomorrow with Dr. Davis however pt would now like to be discharged home and follow up out-patient. Pt has been on Tylenol here, recc OTC pain medication

## 2024-05-04 NOTE — PROGRESS NOTE ADULT - ASSESSMENT
73M with no PMHx and PSHx of open R inguinal hernia repair w/ mesh (40yrs ago) with known 4m hx of L reducible inguinal hernia presented to [facility] ED with 1d hx of nausea, emesis, and abdominal pain associated with reducible inguinal hernia. Pending OR, possibly next week, Oscar vs Nancy    Regular  Pain and nausea prn  SQH/SCD/OOB  No AM labs  Dispo: home

## 2024-05-04 NOTE — DISCHARGE NOTE NURSING/CASE MANAGEMENT/SOCIAL WORK - PATIENT PORTAL LINK FT
You can access the FollowMyHealth Patient Portal offered by [facility] by registering at the following website: [email or web address]. By joining [facility]’s FollowMyHealth portal, you will also be able to view your health information using other applications (apps) compatible with our system.

## 2024-05-04 NOTE — PROGRESS NOTE ADULT - SUBJECTIVE AND OBJECTIVE BOX
STATUS POST:      POST OPERATIVE DAY #:     SUBJECTIVE:     Vital Signs Last 24 Hrs  T(C): 36.6 (03 May 2024 16:35), Max: 36.8 (03 May 2024 10:00)  T(F): 97.9 (03 May 2024 16:35), Max: 98.2 (03 May 2024 10:00)  HR: 59 (03 May 2024 16:35) (50 - 59)  BP: 127/79 (03 May 2024 16:35) (127/79 - 147/68)  BP(mean): 98 (03 May 2024 13:32) (95 - 98)  RR: 18 (03 May 2024 16:35) (18 - 18)  SpO2: 95% (03 May 2024 16:35) (95% - 98%)    Parameters below as of 03 May 2024 16:35  Patient On (Oxygen Delivery Method): room air        I&O's Summary    02 May 2024 07:01  -  03 May 2024 07:00  --------------------------------------------------------  IN: 0 mL / OUT: 500 mL / NET: -500 mL    03 May 2024 07:01  -  04 May 2024 06:32  --------------------------------------------------------  IN: 990 mL / OUT: 525 mL / NET: 465 mL        Physical Exam:  General Appearance: Appears well, NAD  Pulmonary: Nonlabored breathing, no respiratory distress  Cardiovascular: NSR  Abdomen: Soft, nondisteded, appropriate incisional tenderness, dressings clean and dry and intact  Extremities: WWP, SCD's in place     LABS:                        12.9   5.39  )-----------( 140      ( 03 May 2024 05:30 )             39.6     05-03    139  |  104  |  15  ----------------------------<  84  4.2   |  22  |  0.65    Ca    8.7      03 May 2024 05:30  Phos  3.4     05-03  Mg     1.7     05-03      PT/INR - ( 03 May 2024 05:30 )   PT: 10.9 sec;   INR: 0.95          PTT - ( 03 May 2024 05:30 )  PTT:27.5 sec  Urinalysis Basic - ( 03 May 2024 05:30 )    Color: x / Appearance: x / SG: x / pH: x  Gluc: 84 mg/dL / Ketone: x  / Bili: x / Urobili: x   Blood: x / Protein: x / Nitrite: x   Leuk Esterase: x / RBC: x / WBC x   Sq Epi: x / Non Sq Epi: x / Bacteria: x           SUBJECTIVE: Pt seen and examined at the bedside by surgical team. Doing well, no acute complaints, surgery will be rescheduled, pending final plan, Oscar vs Nancy next week     Vital Signs Last 24 Hrs  T(C): 36.6 (03 May 2024 16:35), Max: 36.8 (03 May 2024 10:00)  T(F): 97.9 (03 May 2024 16:35), Max: 98.2 (03 May 2024 10:00)  HR: 59 (03 May 2024 16:35) (50 - 59)  BP: 127/79 (03 May 2024 16:35) (127/79 - 147/68)  BP(mean): 98 (03 May 2024 13:32) (95 - 98)  RR: 18 (03 May 2024 16:35) (18 - 18)  SpO2: 95% (03 May 2024 16:35) (95% - 98%)    Parameters below as of 03 May 2024 16:35  Patient On (Oxygen Delivery Method): room air        I&O's Summary    02 May 2024 07:01  -  03 May 2024 07:00  --------------------------------------------------------  IN: 0 mL / OUT: 500 mL / NET: -500 mL    03 May 2024 07:01  -  04 May 2024 06:32  --------------------------------------------------------  IN: 990 mL / OUT: 525 mL / NET: 465 mL        Physical Exam:  General Appearance: Appears well, NAD  Pulmonary: Nonlabored breathing, no respiratory distress  Cardiovascular: NSR  Abdomen: Soft, NT, ND  Extremities: WWP, SCD's in place     LABS:                        12.9   5.39  )-----------( 140      ( 03 May 2024 05:30 )             39.6     05-03    139  |  104  |  15  ----------------------------<  84  4.2   |  22  |  0.65    Ca    8.7      03 May 2024 05:30  Phos  3.4     05-03  Mg     1.7     05-03      PT/INR - ( 03 May 2024 05:30 )   PT: 10.9 sec;   INR: 0.95          PTT - ( 03 May 2024 05:30 )  PTT:27.5 sec  Urinalysis Basic - ( 03 May 2024 05:30 )    Color: x / Appearance: x / SG: x / pH: x  Gluc: 84 mg/dL / Ketone: x  / Bili: x / Urobili: x   Blood: x / Protein: x / Nitrite: x   Leuk Esterase: x / RBC: x / WBC x   Sq Epi: x / Non Sq Epi: x / Bacteria: x

## 2024-05-09 ENCOUNTER — APPOINTMENT (OUTPATIENT)
Dept: HEART AND VASCULAR | Facility: CLINIC | Age: 74
End: 2024-05-09

## 2024-05-10 DIAGNOSIS — K40.30 UNILATERAL INGUINAL HERNIA, WITH OBSTRUCTION, WITHOUT GANGRENE, NOT SPECIFIED AS RECURRENT: ICD-10-CM
